# Patient Record
Sex: MALE | Race: WHITE | NOT HISPANIC OR LATINO | Employment: OTHER | URBAN - METROPOLITAN AREA
[De-identification: names, ages, dates, MRNs, and addresses within clinical notes are randomized per-mention and may not be internally consistent; named-entity substitution may affect disease eponyms.]

---

## 2017-06-14 ENCOUNTER — LAB (OUTPATIENT)
Dept: LAB | Facility: HOSPITAL | Age: 67
End: 2017-06-14
Attending: INTERNAL MEDICINE

## 2017-06-14 ENCOUNTER — APPOINTMENT (OUTPATIENT)
Dept: CARDIOLOGY | Facility: HOSPITAL | Age: 67
End: 2017-06-14
Attending: INTERNAL MEDICINE

## 2017-06-14 ENCOUNTER — OFFICE VISIT (OUTPATIENT)
Dept: CARDIOLOGY | Facility: CLINIC | Age: 67
End: 2017-06-14

## 2017-06-14 VITALS
HEART RATE: 122 BPM | SYSTOLIC BLOOD PRESSURE: 140 MMHG | DIASTOLIC BLOOD PRESSURE: 96 MMHG | HEIGHT: 73 IN | BODY MASS INDEX: 34.99 KG/M2 | WEIGHT: 264 LBS

## 2017-06-14 DIAGNOSIS — I48.0 PAF (PAROXYSMAL ATRIAL FIBRILLATION) (HCC): ICD-10-CM

## 2017-06-14 DIAGNOSIS — I10 ESSENTIAL HYPERTENSION: ICD-10-CM

## 2017-06-14 LAB
ALBUMIN SERPL-MCNC: 4.1 G/DL (ref 3.5–5.2)
ALBUMIN/GLOB SERPL: 1.3 G/DL
ALP SERPL-CCNC: 77 U/L (ref 40–129)
ALT SERPL W P-5'-P-CCNC: 21 U/L (ref 5–41)
ANION GAP SERPL CALCULATED.3IONS-SCNC: 14.9 MMOL/L
AST SERPL-CCNC: 26 U/L (ref 5–40)
BILIRUB SERPL-MCNC: 1.8 MG/DL (ref 0.2–1.2)
BUN BLD-MCNC: 21 MG/DL (ref 8–23)
BUN/CREAT SERPL: 24.4 (ref 7–25)
CALCIUM SPEC-SCNC: 9 MG/DL (ref 8.8–10.5)
CHLORIDE SERPL-SCNC: 100 MMOL/L (ref 98–107)
CO2 SERPL-SCNC: 22.1 MMOL/L (ref 22–29)
CREAT BLD-MCNC: 0.86 MG/DL (ref 0.76–1.27)
DEPRECATED RDW RBC AUTO: 40.6 FL (ref 37–54)
ERYTHROCYTE [DISTWIDTH] IN BLOOD BY AUTOMATED COUNT: 12.6 % (ref 11.5–14.5)
GFR SERPL CREATININE-BSD FRML MDRD: 89 ML/MIN/1.73
GLOBULIN UR ELPH-MCNC: 3.2 GM/DL
GLUCOSE BLD-MCNC: 109 MG/DL (ref 65–99)
HCT VFR BLD AUTO: 49.2 % (ref 42–52)
HGB BLD-MCNC: 17.3 G/DL (ref 14–18)
MCH RBC QN AUTO: 30.8 PG (ref 27–31)
MCHC RBC AUTO-ENTMCNC: 35.2 G/DL (ref 31–37)
MCV RBC AUTO: 87.7 FL (ref 80–94)
PLATELET # BLD AUTO: 227 10*3/MM3 (ref 140–500)
PMV BLD AUTO: 10.9 FL (ref 7.4–10.4)
POTASSIUM BLD-SCNC: 4.9 MMOL/L (ref 3.5–5.2)
PROT SERPL-MCNC: 7.3 G/DL (ref 6–8.5)
RBC # BLD AUTO: 5.61 10*6/MM3 (ref 4.7–6.1)
SODIUM BLD-SCNC: 137 MMOL/L (ref 136–145)
T-UPTAKE NFR SERPL: 1.01 TBI (ref 0.8–1.3)
T4 SERPL-MCNC: 7.42 MCG/DL (ref 4.5–11.7)
TSH SERPL DL<=0.05 MIU/L-ACNC: 1.54 MIU/ML (ref 0.27–4.2)
WBC NRBC COR # BLD: 10.97 10*3/MM3 (ref 4.8–10.8)

## 2017-06-14 PROCEDURE — 84436 ASSAY OF TOTAL THYROXINE: CPT

## 2017-06-14 PROCEDURE — 84479 ASSAY OF THYROID (T3 OR T4): CPT

## 2017-06-14 PROCEDURE — 80053 COMPREHEN METABOLIC PANEL: CPT

## 2017-06-14 PROCEDURE — 93000 ELECTROCARDIOGRAM COMPLETE: CPT | Performed by: INTERNAL MEDICINE

## 2017-06-14 PROCEDURE — 85027 COMPLETE CBC AUTOMATED: CPT

## 2017-06-14 PROCEDURE — 99214 OFFICE O/P EST MOD 30 MIN: CPT | Performed by: INTERNAL MEDICINE

## 2017-06-14 PROCEDURE — 84443 ASSAY THYROID STIM HORMONE: CPT

## 2017-06-14 PROCEDURE — 36415 COLL VENOUS BLD VENIPUNCTURE: CPT

## 2017-06-14 RX ORDER — SOTALOL HYDROCHLORIDE 160 MG/1
160 TABLET ORAL 2 TIMES DAILY
Qty: 180 TABLET | Refills: 3 | Status: SHIPPED | OUTPATIENT
Start: 2017-06-14 | End: 2018-04-30 | Stop reason: SDUPTHER

## 2017-06-14 NOTE — PROGRESS NOTES
Subjective:     Encounter Date:      Patient ID: Hakeem Onofre Jr. is a 66 y.o. male.    Chief Complaint:  History of Present Illness    Dr. bolivar hart,    I had the pleasure of seeing this patient in the office today for follow-up of his cardiac status.  Patient has a history of paroxysmal atrial fibrillation.    He states that 2 or 3 weeks ago he started noting that his heart rate was faster.  He was suspicious that he was back in atrial fibrillation.  He's had some greater fatigability but outside of that no other complaints.  No chest pain, pressure, tightness, squeezing, or heartburn.  No dizziness or lightheadedness.  No presyncope or syncope.  No lower extremity edema.  No other medical illnesses.  He is been compliant on his medical regimen, although today he is not sure what dose of metoprolol he is taking.  The last time we sent in a prescription in July 2016 it was for metoprolol succinate 100 mg to be taken once daily.  He thinks that might be right, but he is not sure.    He has no other known cardiac disease.This patient has no history of coronary artery disease, congestive heart failure, rheumatic fever, rheumatic heart disease, congenital heart disease or heart murmur.  This patient has never required invasive cardiovascular evaluation.  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  This demonstrated an ejection fraction of 62% and no ischemia.    Patient has a history of atrial flutter that was diagnosed in 2012 and at that point he underwent atrial flutter ablation.    The following portions of the patient's history were reviewed and updated as appropriate: allergies, current medications, past family history, past medical history, past social history, past surgical history and problem list.    Past Medical History:   Diagnosis Date   • Arrhythmia    • Atrial fibrillation    • Hypertension        Past Surgical History:   Procedure Laterality Date   • ABLATION OF DYSRHYTHMIC FOCUS   2012    for treatment of Afib/flutter   • CARDIAC CATHETERIZATION         Social History     Social History   • Marital status:      Spouse name: N/A   • Number of children: N/A   • Years of education: N/A     Occupational History   • Not on file.     Social History Main Topics   • Smoking status: Never Smoker   • Smokeless tobacco: Not on file   • Alcohol use 1.8 oz/week     3 Cans of beer per week   • Drug use: No   • Sexual activity: Defer     Other Topics Concern   • Not on file     Social History Narrative       Review of Systems   Constitution: Negative for chills, decreased appetite, fever and night sweats.   HENT: Negative for ear discharge, ear pain, hearing loss, nosebleeds and sore throat.    Eyes: Negative for blurred vision, double vision and pain.   Cardiovascular: Negative for cyanosis.   Respiratory: Negative for hemoptysis and sputum production.    Endocrine: Negative for cold intolerance and heat intolerance.   Hematologic/Lymphatic: Negative for adenopathy.   Skin: Negative for dry skin, itching, nail changes, rash and suspicious lesions.   Musculoskeletal: Negative for arthritis, gout, muscle cramps, muscle weakness, myalgias and neck pain.   Gastrointestinal: Negative for anorexia, bowel incontinence, constipation, diarrhea, dysphagia, hematemesis and jaundice.   Genitourinary: Negative for bladder incontinence, dysuria, flank pain, frequency, hematuria and nocturia.   Neurological: Negative for focal weakness, numbness, paresthesias and seizures.   Psychiatric/Behavioral: Negative for altered mental status, hallucinations, hypervigilance, suicidal ideas and thoughts of violence.   Allergic/Immunologic: Negative for persistent infections.         ECG 12 Lead  Date/Time: 6/14/2017 1:07 PM  Performed by: KONG DAWSON III  Authorized by: KONG DAWSON III   Comparison: compared with previous ECG   Comparison to previous ECG: A. fib is new  Rhythm: atrial fibrillation  Rate:  "tachycardic  ST Segments: ST segments normal  T Waves: T waves normal  QRS axis: normal  Other: no other findings  Clinical impression: abnormal ECG               Objective:     Vitals:    06/14/17 1215   BP: 140/96   Pulse: (!) 122   Weight: 264 lb (120 kg)   Height: 73\" (185.4 cm)         Physical Exam   Constitutional: He is oriented to person, place, and time. He appears well-developed and well-nourished. No distress.   HENT:   Head: Normocephalic and atraumatic.   Nose: Nose normal.   Mouth/Throat: Oropharynx is clear and moist.   Eyes: Conjunctivae and EOM are normal. Pupils are equal, round, and reactive to light. Right eye exhibits no discharge. Left eye exhibits no discharge.   Neck: Normal range of motion. Neck supple. No tracheal deviation present. No thyromegaly present.   Cardiovascular: Normal rate, regular rhythm, S1 normal, S2 normal, normal heart sounds and normal pulses.  Exam reveals no S3.    Pulmonary/Chest: Effort normal and breath sounds normal. No stridor. No respiratory distress. He exhibits no tenderness.   Abdominal: Soft. Bowel sounds are normal. He exhibits no distension and no mass. There is no tenderness. There is no rebound and no guarding.   Musculoskeletal: Normal range of motion. He exhibits no tenderness or deformity.   Lymphadenopathy:     He has no cervical adenopathy.   Neurological: He is alert and oriented to person, place, and time. He has normal reflexes.   Skin: Skin is warm and dry. No rash noted. He is not diaphoretic. No erythema.   Psychiatric: He has a normal mood and affect. Thought content normal.       Lab Review:           performed      Assessment:          Diagnosis Plan   1. PAF (paroxysmal atrial fibrillation)  Comprehensive Metabolic Panel    Thyroid Panel With TSH    CBC (No Diff)    Holter Monitor - 24 Hour   2. Essential hypertension            Plan:       1. Atrial Fibrillation and Atrial Flutter  Assessment  • The patient has paroxysmal atrial " fibrillation  • This is non-valvular in etiology  • The patient's CHADS2-VASc score is 2  • A [id number] score of 2 or more is considered a high risk for a thromboembolic event  • Rivaroxaban prescribed    Plan  • Attempt to maintain sinus rhythm  • Continue rivaroxaban for antithrombotic therapy, bleeding issues discussed  • Add sotalol for rhythm control  • We will obtain labs today and increase his sotalol to 160 mg twice daily.  Additionally, he will contact our office to confirm the dose of diuretics that he is taking at home-he's currently not sure.  He is supposed to be on metoprolol succinate 100 mg once daily.  We will see him back in 2 weeks.      Thank you very much for allowing us to participate in the care of this pleasant patient.  Please don't hesitate to call if I can be of assistance in any way.      Current Outpatient Prescriptions:   •  lisinopril (PRINIVIL,ZESTRIL) 10 MG tablet, Take 1 tablet by mouth daily., Disp: 90 tablet, Rfl: 3  •  sotalol (BETAPACE) 120 MG tablet, TAKE ONE TABLET BY MOUTH TWICE DAILY, Disp: 180 tablet, Rfl: 1  •  XARELTO 20 MG tablet, TAKE ONE TABLET BY MOUTH ONCE DAILY WITH FOOD, Disp: 30 tablet, Rfl: 3  •  metoprolol succinate XL (TOPROL-XL) 100 MG 24 hr tablet, TAKE ONE TABLET BY MOUTH  DAILY, Disp: 30 tablet, Rfl: 3  •  metoprolol tartrate (LOPRESSOR) 50 MG tablet, Take 50 mg by mouth 2 (two) times a day., Disp: , Rfl:

## 2017-06-19 ENCOUNTER — HOSPITAL ENCOUNTER (OUTPATIENT)
Dept: CARDIOLOGY | Facility: HOSPITAL | Age: 67
Discharge: HOME | End: 2017-06-19
Attending: INTERNAL MEDICINE | Admitting: INTERNAL MEDICINE

## 2017-06-19 DIAGNOSIS — I48.0 PAF (PAROXYSMAL ATRIAL FIBRILLATION) (HCC): ICD-10-CM

## 2017-06-19 PROCEDURE — 93225 XTRNL ECG REC<48 HRS REC: CPT

## 2017-06-19 PROCEDURE — 93226 XTRNL ECG REC<48 HR SCAN A/R: CPT

## 2017-06-20 ENCOUNTER — APPOINTMENT (OUTPATIENT)
Dept: CARDIOLOGY | Facility: HOSPITAL | Age: 67
End: 2017-06-20
Attending: INTERNAL MEDICINE

## 2017-06-23 PROCEDURE — 93227 XTRNL ECG REC<48 HR R&I: CPT | Performed by: INTERNAL MEDICINE

## 2017-07-05 ENCOUNTER — OFFICE VISIT (OUTPATIENT)
Dept: CARDIOLOGY | Facility: CLINIC | Age: 67
End: 2017-07-05

## 2017-07-05 VITALS
HEART RATE: 92 BPM | WEIGHT: 271 LBS | BODY MASS INDEX: 35.92 KG/M2 | DIASTOLIC BLOOD PRESSURE: 94 MMHG | HEIGHT: 73 IN | SYSTOLIC BLOOD PRESSURE: 122 MMHG

## 2017-07-05 DIAGNOSIS — I48.0 PAROXYSMAL ATRIAL FIBRILLATION (HCC): Primary | ICD-10-CM

## 2017-07-05 DIAGNOSIS — I10 ESSENTIAL HYPERTENSION: ICD-10-CM

## 2017-07-05 PROCEDURE — 99214 OFFICE O/P EST MOD 30 MIN: CPT | Performed by: INTERNAL MEDICINE

## 2017-07-05 PROCEDURE — 93000 ELECTROCARDIOGRAM COMPLETE: CPT | Performed by: INTERNAL MEDICINE

## 2017-07-05 NOTE — PROGRESS NOTES
Subjective:     Encounter Date: 7/5/2017      Patient ID: Hakeem Onofre Jr. is a 66 y.o. male.    Chief Complaint:  History of Present Illness    Dr. bolivar hart,    I had the pleasure of seeing this patient in the office today for follow-up of his cardiac status.  Patient has a history of paroxysmal atrial fibrillation.He recently came to our office and was found to be back in atrial fibrillation.  We increased his sotalol and had him come back today.  He says that he's been feeling okay and on the higher dose sotalol he really doesn't feel anything; no palpitations or heart racing.  He can occasionally note that his heart is irregular, any particular notice this when he is laying down.  He was pretty sure when he came in that he was still in the atrial fibrillation.    He had right eye surgery for cataracts last week and is scheduled to have cataract surgery on his left eye next week.    He has no other known cardiac disease.This patient has no history of coronary artery disease, congestive heart failure, rheumatic fever, rheumatic heart disease, congenital heart disease or heart murmur.  This patient has never required invasive cardiovascular evaluation.  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  This demonstrated an ejection fraction of 62% and no ischemia.    Patient has a history of atrial flutter that was diagnosed in 2012 and at that point he underwent atrial flutter ablation.    The following portions of the patient's history were reviewed and updated as appropriate: allergies, current medications, past family history, past medical history, past social history, past surgical history and problem list.    Past Medical History:   Diagnosis Date   • Arrhythmia    • Atrial fibrillation    • Hypertension        Past Surgical History:   Procedure Laterality Date   • ABLATION OF DYSRHYTHMIC FOCUS  2012    for treatment of Afib/flutter   • CARDIAC CATHETERIZATION     • CATARACT EXTRACTION          Social History     Social History   • Marital status:      Spouse name: N/A   • Number of children: N/A   • Years of education: N/A     Occupational History   • Not on file.     Social History Main Topics   • Smoking status: Never Smoker   • Smokeless tobacco: Not on file   • Alcohol use 1.8 oz/week     3 Cans of beer per week   • Drug use: No   • Sexual activity: Defer     Other Topics Concern   • Not on file     Social History Narrative       Review of Systems   Constitution: Negative for chills, decreased appetite, fever and night sweats.   HENT: Negative for ear discharge, ear pain, hearing loss, nosebleeds and sore throat.    Eyes: Negative for blurred vision, double vision and pain.   Cardiovascular: Negative for cyanosis.   Respiratory: Negative for hemoptysis and sputum production.    Endocrine: Negative for cold intolerance and heat intolerance.   Hematologic/Lymphatic: Negative for adenopathy.   Skin: Negative for dry skin, itching, nail changes, rash and suspicious lesions.   Musculoskeletal: Negative for arthritis, gout, muscle cramps, muscle weakness, myalgias and neck pain.   Gastrointestinal: Negative for anorexia, bowel incontinence, constipation, diarrhea, dysphagia, hematemesis and jaundice.   Genitourinary: Negative for bladder incontinence, dysuria, flank pain, frequency, hematuria and nocturia.   Neurological: Negative for focal weakness, numbness, paresthesias and seizures.   Psychiatric/Behavioral: Negative for altered mental status, hallucinations, hypervigilance, suicidal ideas and thoughts of violence.   Allergic/Immunologic: Negative for persistent infections.         ECG 12 Lead  Date/Time: 7/5/2017 1:19 PM  Performed by: KONG DAWSON III  Authorized by: KONG DAWSON III   Comparison: compared with previous ECG   Similar to previous ECG  Rhythm: atrial fibrillation  Rate: normal  Conduction: conduction normal  ST Segments: ST segments normal  T Waves: T waves  "normal  QRS axis: normal  Other: no other findings  Clinical impression: normal ECG               Objective:     Vitals:    07/05/17 1233   BP: 122/94   Pulse: 92   Weight: 271 lb (123 kg)   Height: 73\" (185.4 cm)         Physical Exam   Constitutional: He is oriented to person, place, and time. He appears well-developed and well-nourished. No distress.   HENT:   Head: Normocephalic and atraumatic.   Nose: Nose normal.   Mouth/Throat: Oropharynx is clear and moist.   Eyes: Conjunctivae and EOM are normal. Pupils are equal, round, and reactive to light. Right eye exhibits no discharge. Left eye exhibits no discharge.   Neck: Normal range of motion. Neck supple. No tracheal deviation present. No thyromegaly present.   Cardiovascular: Normal rate, regular rhythm, S1 normal, S2 normal, normal heart sounds and normal pulses.  Exam reveals no S3.    Pulmonary/Chest: Effort normal and breath sounds normal. No stridor. No respiratory distress. He exhibits no tenderness.   Abdominal: Soft. Bowel sounds are normal. He exhibits no distension and no mass. There is no tenderness. There is no rebound and no guarding.   Musculoskeletal: Normal range of motion. He exhibits no tenderness or deformity.   Lymphadenopathy:     He has no cervical adenopathy.   Neurological: He is alert and oriented to person, place, and time. He has normal reflexes.   Skin: Skin is warm and dry. No rash noted. He is not diaphoretic. No erythema.   Psychiatric: He has a normal mood and affect. Thought content normal.       Lab Review:           performed      Assessment:          Diagnosis Plan   1. Paroxysmal atrial fibrillation  Cardioversion External in Cardiology Department    ECG 12 Lead   2. Essential hypertension            Plan:       1. Atrial Fibrillation and Atrial Flutter  Assessment  • The patient has paroxysmal atrial fibrillation  • This is non-valvular in etiology  • The patient's CHADS2-VASc score is 2  • A [id number] score of 2 or " more is considered a high risk for a thromboembolic event  • Rivaroxaban prescribed    Plan  • Attempt to maintain sinus rhythm  • Continue rivaroxaban for antithrombotic therapy, bleeding issues discussed  • Continue sotalol for rhythm control  • He remains in atrial fibrillation on the increased dose of sotalol-I will arrange for a cardioversion to be performed.  We will plan this in about 3 weeks, after he is had and then recovered from his left eye cataract surgery      Thank you very much for allowing us to participate in the care of this pleasant patient.  Please don't hesitate to call if I can be of assistance in any way.      Current Outpatient Prescriptions:   •  lisinopril (PRINIVIL,ZESTRIL) 10 MG tablet, Take 1 tablet by mouth daily., Disp: 90 tablet, Rfl: 3  •  metoprolol succinate XL (TOPROL-XL) 100 MG 24 hr tablet, TAKE ONE TABLET BY MOUTH  DAILY, Disp: 30 tablet, Rfl: 3  •  sotalol (BETAPACE) 160 MG tablet, Take 1 tablet by mouth 2 (Two) Times a Day., Disp: 180 tablet, Rfl: 3  •  XARELTO 20 MG tablet, TAKE ONE TABLET BY MOUTH ONCE DAILY WITH FOOD, Disp: 30 tablet, Rfl: 3

## 2017-07-24 ENCOUNTER — TELEPHONE (OUTPATIENT)
Dept: CARDIOLOGY | Facility: CLINIC | Age: 67
End: 2017-07-24

## 2017-07-24 NOTE — TELEPHONE ENCOUNTER
Pt calls to report that he has had his second eye surgery without any complications however he was told by the surgeon that he appeared to be in sinus rhythm. Mr. Onofre wanted to know if this would change his treatment plan with the upcoming cardioversion, he states that he is scheduled for one later this week with you but I did not see it in his chart. Please advise    [phone]

## 2017-07-26 ENCOUNTER — CLINICAL SUPPORT (OUTPATIENT)
Dept: CARDIOLOGY | Facility: CLINIC | Age: 67
End: 2017-07-26

## 2017-07-26 DIAGNOSIS — I48.0 PAF (PAROXYSMAL ATRIAL FIBRILLATION) (HCC): Primary | ICD-10-CM

## 2017-07-26 PROCEDURE — 93000 ELECTROCARDIOGRAM COMPLETE: CPT | Performed by: INTERNAL MEDICINE

## 2017-07-27 ENCOUNTER — APPOINTMENT (OUTPATIENT)
Dept: POSTOP/PACU | Facility: HOSPITAL | Age: 67
End: 2017-07-27
Attending: INTERNAL MEDICINE

## 2017-08-14 RX ORDER — RIVAROXABAN 20 MG/1
TABLET, FILM COATED ORAL
Qty: 30 TABLET | Refills: 0 | Status: SHIPPED | OUTPATIENT
Start: 2017-08-14 | End: 2017-09-21 | Stop reason: SDUPTHER

## 2017-08-14 RX ORDER — METOPROLOL SUCCINATE 100 MG/1
TABLET, EXTENDED RELEASE ORAL
Qty: 30 TABLET | Refills: 0 | Status: SHIPPED | OUTPATIENT
Start: 2017-08-14 | End: 2017-09-23 | Stop reason: SDUPTHER

## 2017-09-21 RX ORDER — RIVAROXABAN 20 MG/1
TABLET, FILM COATED ORAL
Qty: 30 TABLET | Refills: 4 | Status: SHIPPED | OUTPATIENT
Start: 2017-09-21 | End: 2018-04-30 | Stop reason: SDUPTHER

## 2017-09-25 RX ORDER — METOPROLOL SUCCINATE 100 MG/1
TABLET, EXTENDED RELEASE ORAL
Qty: 30 TABLET | Refills: 5 | Status: SHIPPED | OUTPATIENT
Start: 2017-09-25 | End: 2018-04-30 | Stop reason: SDUPTHER

## 2017-10-09 NOTE — PROGRESS NOTES
Subjective:     Encounter Date:07/26/2017      Patient ID: Hakeem Onofre Jr. is a 67 y.o. male.    Chief Complaint:  History of Present Illness      ROS      ECG 12 Lead  Date/Time: 7/26/2017 1:52 PM  Performed by: KONG DAWSON III  Authorized by: KONG ADWSON III   Comparison: compared with previous ECG   Similar to previous ECG  Rhythm: sinus rhythm  Rate: normal  Conduction: conduction normal  ST Segments: ST segments normal  T Waves: T waves normal  QRS axis: normal  Other: no other findings  Clinical impression: normal ECG               Objective:     Physical Exam    Lab Review:       Assessment:          Diagnosis Plan   1. PAF (paroxysmal atrial fibrillation)  ECG 12 Lead          Plan:

## 2018-05-01 RX ORDER — RIVAROXABAN 20 MG/1
TABLET, FILM COATED ORAL
Qty: 90 TABLET | Refills: 0 | Status: SHIPPED | OUTPATIENT
Start: 2018-05-01 | End: 2018-07-24 | Stop reason: SDUPTHER

## 2018-05-01 RX ORDER — METOPROLOL SUCCINATE 100 MG/1
TABLET, EXTENDED RELEASE ORAL
Qty: 90 TABLET | Refills: 0 | Status: SHIPPED | OUTPATIENT
Start: 2018-05-01 | End: 2018-07-24 | Stop reason: SDUPTHER

## 2018-05-01 RX ORDER — LISINOPRIL 10 MG/1
TABLET ORAL
Qty: 90 TABLET | Refills: 0 | Status: SHIPPED | OUTPATIENT
Start: 2018-05-01 | End: 2018-12-05 | Stop reason: SDUPTHER

## 2018-05-02 RX ORDER — SOTALOL HYDROCHLORIDE 160 MG/1
TABLET ORAL
Qty: 180 TABLET | Refills: 1 | Status: SHIPPED | OUTPATIENT
Start: 2018-05-02 | End: 2018-07-24 | Stop reason: SDUPTHER

## 2018-07-25 RX ORDER — RIVAROXABAN 20 MG/1
TABLET, FILM COATED ORAL
Qty: 30 TABLET | Refills: 0 | Status: SHIPPED | OUTPATIENT
Start: 2018-07-25 | End: 2018-09-20 | Stop reason: SDUPTHER

## 2018-07-25 RX ORDER — SOTALOL HYDROCHLORIDE 160 MG/1
TABLET ORAL
Qty: 30 TABLET | Refills: 1 | Status: SHIPPED | OUTPATIENT
Start: 2018-07-25 | End: 2018-10-30 | Stop reason: SDUPTHER

## 2018-07-25 RX ORDER — METOPROLOL SUCCINATE 100 MG/1
TABLET, EXTENDED RELEASE ORAL
Qty: 30 TABLET | Refills: 0 | Status: SHIPPED | OUTPATIENT
Start: 2018-07-25 | End: 2018-09-20 | Stop reason: SDUPTHER

## 2018-09-20 RX ORDER — RIVAROXABAN 20 MG/1
TABLET, FILM COATED ORAL
Qty: 30 TABLET | Refills: 0 | Status: SHIPPED | OUTPATIENT
Start: 2018-09-20 | End: 2018-10-30 | Stop reason: SDUPTHER

## 2018-09-20 RX ORDER — METOPROLOL SUCCINATE 100 MG/1
TABLET, EXTENDED RELEASE ORAL
Qty: 30 TABLET | Refills: 0 | Status: SHIPPED | OUTPATIENT
Start: 2018-09-20 | End: 2018-10-30 | Stop reason: SDUPTHER

## 2018-10-30 RX ORDER — METOPROLOL SUCCINATE 100 MG/1
100 TABLET, EXTENDED RELEASE ORAL DAILY
Qty: 30 TABLET | Refills: 0 | Status: SHIPPED | OUTPATIENT
Start: 2018-10-30 | End: 2018-12-05 | Stop reason: SDUPTHER

## 2018-10-30 RX ORDER — SOTALOL HYDROCHLORIDE 160 MG/1
160 TABLET ORAL 2 TIMES DAILY
Qty: 30 TABLET | Refills: 1 | Status: SHIPPED | OUTPATIENT
Start: 2018-10-30 | End: 2018-12-05 | Stop reason: SDUPTHER

## 2018-12-05 ENCOUNTER — OFFICE VISIT (OUTPATIENT)
Dept: CARDIOLOGY | Facility: CLINIC | Age: 68
End: 2018-12-05

## 2018-12-05 VITALS
HEART RATE: 66 BPM | WEIGHT: 274 LBS | DIASTOLIC BLOOD PRESSURE: 96 MMHG | HEIGHT: 73 IN | BODY MASS INDEX: 36.31 KG/M2 | SYSTOLIC BLOOD PRESSURE: 160 MMHG

## 2018-12-05 DIAGNOSIS — I48.0 PAROXYSMAL ATRIAL FIBRILLATION (HCC): Primary | ICD-10-CM

## 2018-12-05 PROCEDURE — 99214 OFFICE O/P EST MOD 30 MIN: CPT | Performed by: INTERNAL MEDICINE

## 2018-12-05 PROCEDURE — 93000 ELECTROCARDIOGRAM COMPLETE: CPT | Performed by: INTERNAL MEDICINE

## 2018-12-05 RX ORDER — METOPROLOL SUCCINATE 100 MG/1
100 TABLET, EXTENDED RELEASE ORAL DAILY
Qty: 90 TABLET | Refills: 3 | Status: SHIPPED | OUTPATIENT
Start: 2018-12-05 | End: 2019-11-08 | Stop reason: SDUPTHER

## 2018-12-05 RX ORDER — SOTALOL HYDROCHLORIDE 160 MG/1
160 TABLET ORAL 2 TIMES DAILY
Qty: 90 TABLET | Refills: 3 | Status: SHIPPED | OUTPATIENT
Start: 2018-12-05 | End: 2019-05-29 | Stop reason: SDUPTHER

## 2018-12-05 RX ORDER — LISINOPRIL 10 MG/1
10 TABLET ORAL DAILY
Qty: 90 TABLET | Refills: 3 | Status: SHIPPED | OUTPATIENT
Start: 2018-12-05 | End: 2019-08-07

## 2018-12-05 NOTE — PROGRESS NOTES
Subjective:     Encounter Date: 12/5/2018      Patient ID: Hakeem Onofre Jr. is a 68 y.o. male.    Chief Complaint: PAF  History of Present Illness      I had the pleasure of seeing this patient in the office today for follow-up of his cardiac status.  Patient has a history of paroxysmal atrial fibrillation. He had a cardioversion in 2017 and since then has not had any recurrent arrhythmia.    Over his last year he has been doing well without any problem. This patient denies any chest pain, pressure, tightness, squeezing, or heartburn.  This patient has not experienced any feeling of palpitations, tachycardia or heart racing and no presyncope or syncope.  There has not been any problems with dizziness or lightheadedness.  There has not been any orthopnea or PND, and no problems with lower extremity edema.  This patient denies any shortness of breath at rest or with activity and has not had any wheezing.  The patient has continued to perform daily activities of living without any specific problem or change in the level of activity.    He has no other known cardiac disease.This patient has no history of coronary artery disease, congestive heart failure, rheumatic fever, rheumatic heart disease, congenital heart disease or heart murmur.  This patient has never required invasive cardiovascular evaluation.  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  This demonstrated an ejection fraction of 62% and no ischemia.    Patient has a history of atrial flutter that was diagnosed in 2012 and at that point he underwent atrial flutter ablation.    The following portions of the patient's history were reviewed and updated as appropriate: allergies, current medications, past family history, past medical history, past social history, past surgical history and problem list.    Past Medical History:   Diagnosis Date   • Arrhythmia    • Atrial fibrillation (CMS/HCC)    • Hypertension        Past Surgical History:    Procedure Laterality Date   • ABLATION OF DYSRHYTHMIC FOCUS  2012    for treatment of Afib/flutter   • CARDIAC CATHETERIZATION     • CATARACT EXTRACTION         Social History     Socioeconomic History   • Marital status:      Spouse name: Not on file   • Number of children: Not on file   • Years of education: Not on file   • Highest education level: Not on file   Social Needs   • Financial resource strain: Not on file   • Food insecurity - worry: Not on file   • Food insecurity - inability: Not on file   • Transportation needs - medical: Not on file   • Transportation needs - non-medical: Not on file   Occupational History   • Not on file   Tobacco Use   • Smoking status: Never Smoker   Substance and Sexual Activity   • Alcohol use: Yes     Alcohol/week: 1.8 oz     Types: 3 Cans of beer per week   • Drug use: No   • Sexual activity: Defer   Other Topics Concern   • Not on file   Social History Narrative   • Not on file       Review of Systems   Constitution: Negative for chills, decreased appetite, fever and night sweats.   HENT: Negative for ear discharge, ear pain, hearing loss, nosebleeds and sore throat.    Eyes: Negative for blurred vision, double vision and pain.   Cardiovascular: Negative for cyanosis.   Respiratory: Negative for hemoptysis and sputum production.    Endocrine: Negative for cold intolerance and heat intolerance.   Hematologic/Lymphatic: Negative for adenopathy.   Skin: Negative for dry skin, itching, nail changes, rash and suspicious lesions.   Musculoskeletal: Negative for arthritis, gout, muscle cramps, muscle weakness, myalgias and neck pain.   Gastrointestinal: Negative for anorexia, bowel incontinence, constipation, diarrhea, dysphagia, hematemesis and jaundice.   Genitourinary: Negative for bladder incontinence, dysuria, flank pain, frequency, hematuria and nocturia.   Neurological: Negative for focal weakness, numbness, paresthesias and seizures.   Psychiatric/Behavioral:  "Negative for altered mental status, hallucinations, hypervigilance, suicidal ideas and thoughts of violence.   Allergic/Immunologic: Negative for persistent infections.         ECG 12 Lead  Date/Time: 12/5/2018 2:48 PM  Performed by: Donnie Berger III, MD  Authorized by: Donnie Berger III, MD   Comparison: compared with previous ECG   Similar to previous ECG  Rhythm: sinus rhythm  Rate: normal  Conduction: conduction normal  ST Segments: ST segments normal  T Waves: T waves normal  QRS axis: normal  Other: no other findings  Clinical impression: normal ECG               Objective:     Vitals:    12/05/18 1432   BP: 160/96   Pulse: 66   Weight: 124 kg (274 lb)   Height: 185.4 cm (73\")         Physical Exam   Constitutional: He is oriented to person, place, and time. He appears well-developed and well-nourished. No distress.   HENT:   Head: Normocephalic and atraumatic.   Nose: Nose normal.   Mouth/Throat: Oropharynx is clear and moist.   Eyes: Conjunctivae and EOM are normal. Pupils are equal, round, and reactive to light. Right eye exhibits no discharge. Left eye exhibits no discharge.   Neck: Normal range of motion. Neck supple. No tracheal deviation present. No thyromegaly present.   Cardiovascular: Normal rate, regular rhythm, S1 normal, S2 normal, normal heart sounds and normal pulses. Exam reveals no S3.   Pulmonary/Chest: Effort normal and breath sounds normal. No stridor. No respiratory distress. He exhibits no tenderness.   Abdominal: Soft. Bowel sounds are normal. He exhibits no distension and no mass. There is no tenderness. There is no rebound and no guarding.   Musculoskeletal: Normal range of motion. He exhibits no tenderness or deformity.   Lymphadenopathy:     He has no cervical adenopathy.   Neurological: He is alert and oriented to person, place, and time. He has normal reflexes.   Skin: Skin is warm and dry. No rash noted. He is not diaphoretic. No erythema.   Psychiatric: He has a normal mood " and affect. Thought content normal.       Lab Review:           performed      Assessment:          Diagnosis Plan   1. Paroxysmal atrial fibrillation (CMS/HCC)  ECG 12 Lead          Plan:       1. Atrial Fibrillation and Atrial Flutter  Assessment  • The patient has paroxysmal atrial fibrillation  • This is non-valvular in etiology  • The patient's CHADS2-VASc score is 2  • A [id number]-[id number] score of 2 or more is considered a high risk for a thromboembolic event  • Rivaroxaban prescribed    Plan  • Attempt to maintain sinus rhythm  • Continue rivaroxaban for antithrombotic therapy, bleeding issues discussed  • Continue sotalol for rhythm control    2. HTN- elevated today, follow log  Thank you very much for allowing us to participate in the care of this pleasant patient.  Please don't hesitate to call if I can be of assistance in any way.      Current Outpatient Medications:   •  lisinopril (PRINIVIL,ZESTRIL) 10 MG tablet, TAKE ONE TABLET BY MOUTH ONCE DAILY, Disp: 90 tablet, Rfl: 0  •  metoprolol succinate XL (TOPROL-XL) 100 MG 24 hr tablet, Take 1 tablet by mouth Daily., Disp: 30 tablet, Rfl: 0  •  rivaroxaban (XARELTO) 20 MG tablet, Take 1 tablet by mouth Daily. with food., Disp: 30 tablet, Rfl: 0  •  sotalol (BETAPACE) 160 MG tablet, Take 1 tablet by mouth 2 (Two) Times a Day., Disp: 30 tablet, Rfl: 1

## 2019-05-29 DIAGNOSIS — I48.0 PAROXYSMAL ATRIAL FIBRILLATION (HCC): ICD-10-CM

## 2019-05-29 RX ORDER — SOTALOL HYDROCHLORIDE 160 MG/1
TABLET ORAL
Qty: 90 TABLET | Refills: 1 | Status: SHIPPED | OUTPATIENT
Start: 2019-05-29 | End: 2019-09-08 | Stop reason: SDUPTHER

## 2019-06-07 ENCOUNTER — TELEPHONE (OUTPATIENT)
Dept: CARDIOLOGY | Facility: CLINIC | Age: 69
End: 2019-06-07

## 2019-06-17 ENCOUNTER — OFFICE VISIT (OUTPATIENT)
Dept: CARDIOLOGY | Facility: CLINIC | Age: 69
End: 2019-06-17

## 2019-06-17 VITALS
DIASTOLIC BLOOD PRESSURE: 68 MMHG | HEIGHT: 73 IN | BODY MASS INDEX: 33.53 KG/M2 | SYSTOLIC BLOOD PRESSURE: 130 MMHG | WEIGHT: 253 LBS | HEART RATE: 60 BPM

## 2019-06-17 DIAGNOSIS — Z87.19 HISTORY OF GI BLEED: ICD-10-CM

## 2019-06-17 DIAGNOSIS — I10 ESSENTIAL HYPERTENSION: Primary | Chronic | ICD-10-CM

## 2019-06-17 DIAGNOSIS — I48.0 PAROXYSMAL ATRIAL FIBRILLATION (HCC): Chronic | ICD-10-CM

## 2019-06-17 DIAGNOSIS — E66.01 MORBIDLY OBESE (HCC): ICD-10-CM

## 2019-06-17 PROCEDURE — 93000 ELECTROCARDIOGRAM COMPLETE: CPT | Performed by: NURSE PRACTITIONER

## 2019-06-17 PROCEDURE — 99214 OFFICE O/P EST MOD 30 MIN: CPT | Performed by: NURSE PRACTITIONER

## 2019-06-17 RX ORDER — FUROSEMIDE 20 MG/1
20 TABLET ORAL DAILY
Qty: 30 TABLET | Refills: 11 | Status: SHIPPED | OUTPATIENT
Start: 2019-06-17 | End: 2019-11-18

## 2019-06-17 RX ORDER — PANTOPRAZOLE SODIUM 40 MG/1
40 TABLET, DELAYED RELEASE ORAL 2 TIMES DAILY
COMMUNITY
End: 2019-11-18

## 2019-06-17 NOTE — PROGRESS NOTES
Subjective:     Encounter Date:06/17/2019      Patient ID: Hakeem Onofre Jr. is a 68 y.o. male.    Chief Complaint: Follow-up recent hospitalization, GI bleed, atrial fibrillation  History of Present Illness  He is a new patient to me and I reviewed his past medical records.  He is a patient of Dr. Berger.  He has a history of paroxysmal atrial fibrillation.  He had a cardioversion in 2017.  He has a history of atrial flutter that was diagnosed in 2012 and underwent atrial flutter ablation at that time.    He presents today, 6/17/2019, in follow-up to recent hospitalization at [facility].  He states he and his wife were in [facility] to meet their new grandchild.  The day they were to leave, the patient had 3 episodes of nausea, vomiting and diarrhea.  By the third episode he saw blood in his vomitus and in his stool.  His son called EMS and he was taken to the hospital.  He was admitted to the ICU with coffee-ground emesis, dark stools and found to have supratherapeutic INR on Xarelto, bradycardic (HR 49), hypotension (70/44).  He underwent an EGD that showed acute gastritis and non-bleeding 1.2 cm gastric ulcer that was clipped.  He received a total of 4 units of packed red blood cells as well as 1 unit of FFP.  His course was complicated by atrial flutter with RVR and he was started back on metoprolol and sotalol.  He was then restarted on a heparin drip for anticoagulation after GI clearance but patient unfortunately had a bloody bowel movement the same day after which time the heparin was stopped.  He had not had a previous GI bleeding history and states he was started on Xarelto about 5 years ago without any bleeding complications.  He had had a colonoscopy perhaps 10 years ago which showed polyps that were resected but never had an EGD before this admission.    H. pylori serology was positive, stool Ag negative.  He was instructed to have a repeat EGD in 6 to 8 weeks to assess the  healing of his gastric ulcer.  He was started on Protonix 40 mg twice daily.  His hemoglobin day of discharge was 7.3.  He has appointment to follow-up with Dr. Medley in a couple of weeks.  He is currently off oral anticoagulation.  We will address when cleared by GI.    He denies any palpitations, shortness of breath, chest pain or chest tightness.  He denies any lightheadedness or dizziness.  He does have some fatigue since his hospitalization.  He complains of bilateral foot lower extremity pain and edema.  His left foot has 1+ pitting edema up to his knee.  Right foot 2+ pitting edema up to his knee.  He is not on a diuretic.    He is currently off his Xarelto and lisinopril.      The following portions of the patient's history were reviewed and updated as appropriate: allergies, current medications, past family history, past medical history, past social history, past surgical history and problem list.    Review of Systems   Constitution: Negative for weakness and malaise/fatigue.   HENT: Negative for congestion, hoarse voice and sore throat.    Eyes: Negative for blurred vision, double vision, photophobia, vision loss in left eye and vision loss in right eye.   Cardiovascular: Negative for chest pain, dyspnea on exertion, irregular heartbeat, leg swelling, near-syncope, orthopnea, palpitations, paroxysmal nocturnal dyspnea and syncope.   Respiratory: Negative for cough, hemoptysis, shortness of breath, sleep disturbances due to breathing, snoring, sputum production and wheezing.    Endocrine: Negative.    Hematologic/Lymphatic: Does not bruise/bleed easily.   Skin: Negative for color change, dry skin, poor wound healing and rash.   Musculoskeletal: Negative for back pain, falls, gout, joint pain, joint swelling, muscle cramps and muscle weakness.   Gastrointestinal: Negative for abdominal pain, constipation, diarrhea, dysphagia, melena, nausea and vomiting.   Neurological: Negative for excessive daytime  sleepiness, dizziness, headaches, light-headedness, loss of balance, numbness, paresthesias, seizures and vertigo.   Psychiatric/Behavioral: Negative for depression and substance abuse. The patient is not nervous/anxious.        ECG 12 Lead  Date/Time: 6/17/2019 1:59 PM  Performed by: Lucila Richards APRN  Authorized by: Lucila Richards APRN   Comparison: compared with previous ECG from 12/5/2018  Similar to previous ECG  Rhythm: sinus rhythm  Rate: normal  Conduction: conduction normal  ST Segments: ST segments normal  T Waves: T waves normal  QRS axis: normal    Clinical impression: normal ECG               Objective:         Physical Exam   Constitutional: He is oriented to person, place, and time. Vital signs are normal. He appears well-developed and well-nourished. No distress.   HENT:   Head: Normocephalic and atraumatic.   Right Ear: Hearing normal.   Left Ear: Hearing normal.   Eyes: Conjunctivae and lids are normal.   Neck: Normal range of motion. Neck supple. No JVD present. Carotid bruit is not present. No thyromegaly present.   Cardiovascular: Normal rate, regular rhythm, S1 normal, S2 normal, normal heart sounds and intact distal pulses.  PMI is not displaced.  Exam reveals no gallop.    No murmur heard.  Pulses:       Carotid pulses are 2+ on the right side, and 2+ on the left side.       Radial pulses are 2+ on the right side, and 2+ on the left side.        Dorsalis pedis pulses are 2+ on the right side, and 2+ on the left side.        Posterior tibial pulses are 2+ on the right side, and 2+ on the left side.   Pulmonary/Chest: Effort normal and breath sounds normal. No respiratory distress. He has no wheezes. He has no rhonchi. He has no rales. He exhibits no tenderness.   Abdominal: Soft. Normal appearance and bowel sounds are normal. He exhibits no distension, no abdominal bruit and no mass. There is no tenderness.   Musculoskeletal: Normal range of motion.   Exhibits no deformity 1+ pedal and lower  "extremity edema left,  1-2+ lower extremity edema to knee right  Lymphadenopathy:     He has no cervical adenopathy.   Neurological: He is alert and oriented to person, place, and time. No cranial nerve deficit. Coordination and gait normal.   Oriented to person, place and time.   Skin: Skin is warm, dry and intact. No rash noted. He is not diaphoretic. No cyanosis. Nails show no clubbing.   Psychiatric: He has a normal mood and affect. His speech is normal and behavior is normal. Judgment and thought content normal. Cognition and memory are normal.     Vitals:    06/17/19 1253   BP: 130/68   Pulse: 60   Weight: 115 kg (253 lb)   Height: 185.4 cm (73\")           Lab Review:       Assessment:          Diagnosis Plan   1. Essential hypertension  Basic Metabolic Panel   2. Paroxysmal atrial fibrillation (CMS/HCC)     3. Morbidly obese (CMS/HCC)     4. History of GI bleed            Plan:       1.  Essential hypertension-controlled.  He is currently not on his lisinopril.  I am to start him on Lasix 20 mg p.o. x7 days.  This for lower extremity edema.  I will have him check a BMP in 1 week.  He will call me and let me know how he is feeling and if the fluid has gone down.  We will assess the need for continuation of a diuretic at that time.    2 paroxysmal atrial fibrillation- he is back in normal sinus rhythm, rate controlled.  He is currently not on any anticoagulation.  Atrial Fibrillation and Atrial Flutter  Assessment  • The patient's CHADS2-VASc score is 2  • A [id number]-[id number] score of 2 or more is considered a high risk for a thromboembolic event  • Warfarin prescribed    Plan  • Attempt to maintain sinus rhythm  • Continue beta blocker for rhythm control    3.  Morbid obesity-she would benefit from a weight loss program    4.  History of GI bleed - 6/19.  He is currently off anticoagulation.  He has appointment with Dr. Medley in a couple weeks.  Will await clearance from GI before restarting anticoagulation " medication.      He will call me in a week and let me know how the swelling is in his bilateral feet and ankles.    Floyd Richards, AMBREEN      Current Outpatient Medications:   •  metoprolol succinate XL (TOPROL-XL) 100 MG 24 hr tablet, Take 1 tablet by mouth Daily., Disp: 90 tablet, Rfl: 3  •  pantoprazole (PROTONIX) 40 MG EC tablet, Take 40 mg by mouth 2 (Two) Times a Day., Disp: , Rfl:   •  sotalol (BETAPACE) 160 MG tablet, TAKE 1 TABLET BY MOUTH TWICE DAILY, Disp: 90 tablet, Rfl: 1  •  furosemide (LASIX) 20 MG tablet, Take 1 tablet by mouth Daily., Disp: 30 tablet, Rfl: 11  •  lisinopril (PRINIVIL,ZESTRIL) 10 MG tablet, Take 1 tablet by mouth Daily., Disp: 90 tablet, Rfl: 3  •  rivaroxaban (XARELTO) 20 MG tablet, Take 1 tablet by mouth Daily. with food., Disp: 90 tablet, Rfl: 3

## 2019-06-21 ENCOUNTER — TELEPHONE (OUTPATIENT)
Dept: CARDIOLOGY | Facility: CLINIC | Age: 69
End: 2019-06-21

## 2019-06-21 NOTE — TELEPHONE ENCOUNTER
Pt called for update since he started taking lasix 20 mg QD . Pt states that his right foot still some what swelling but he think the medication is working......Xiomara FREDERICK

## 2019-06-24 ENCOUNTER — LAB (OUTPATIENT)
Dept: LAB | Facility: HOSPITAL | Age: 69
End: 2019-06-24

## 2019-06-24 DIAGNOSIS — I10 ESSENTIAL HYPERTENSION: Chronic | ICD-10-CM

## 2019-06-24 LAB
ANION GAP SERPL CALCULATED.3IONS-SCNC: 12.8 MMOL/L
BUN BLD-MCNC: 13 MG/DL (ref 8–23)
BUN/CREAT SERPL: 13.5 (ref 7–25)
CALCIUM SPEC-SCNC: 8.8 MG/DL (ref 8.6–10.5)
CHLORIDE SERPL-SCNC: 102 MMOL/L (ref 98–107)
CO2 SERPL-SCNC: 23.2 MMOL/L (ref 22–29)
CREAT BLD-MCNC: 0.96 MG/DL (ref 0.76–1.27)
GFR SERPL CREATININE-BSD FRML MDRD: 78 ML/MIN/1.73
GLUCOSE BLD-MCNC: 115 MG/DL (ref 65–99)
POTASSIUM BLD-SCNC: 4.1 MMOL/L (ref 3.5–5.2)
SODIUM BLD-SCNC: 138 MMOL/L (ref 136–145)

## 2019-06-24 PROCEDURE — 36415 COLL VENOUS BLD VENIPUNCTURE: CPT

## 2019-06-24 PROCEDURE — 80048 BASIC METABOLIC PNL TOTAL CA: CPT

## 2019-06-26 ENCOUNTER — DOCUMENTATION (OUTPATIENT)
Dept: CARDIOLOGY | Facility: CLINIC | Age: 69
End: 2019-06-26

## 2019-06-26 NOTE — PROGRESS NOTES
Spoke to patient.  He states that he still has swelling in his right leg.  He would like to continue on the Lasix for now.  He has not seen GI yet his appointment is in August but he is on the list for cancellations.  We will continue off his Xarelto until cleared by GI.

## 2019-08-07 ENCOUNTER — OFFICE VISIT (OUTPATIENT)
Dept: CARDIOLOGY | Facility: CLINIC | Age: 69
End: 2019-08-07

## 2019-08-07 VITALS
WEIGHT: 259 LBS | DIASTOLIC BLOOD PRESSURE: 76 MMHG | SYSTOLIC BLOOD PRESSURE: 140 MMHG | HEART RATE: 56 BPM | BODY MASS INDEX: 34.33 KG/M2 | HEIGHT: 73 IN

## 2019-08-07 DIAGNOSIS — I48.0 PAROXYSMAL ATRIAL FIBRILLATION (HCC): Primary | Chronic | ICD-10-CM

## 2019-08-07 DIAGNOSIS — E66.01 MORBIDLY OBESE (HCC): ICD-10-CM

## 2019-08-07 DIAGNOSIS — I10 ESSENTIAL HYPERTENSION: Chronic | ICD-10-CM

## 2019-08-07 PROCEDURE — 99213 OFFICE O/P EST LOW 20 MIN: CPT | Performed by: INTERNAL MEDICINE

## 2019-08-07 NOTE — PROGRESS NOTES
Subjective:     Encounter Date: 8/7/2019      Patient ID: Hakeem Onofre Jr. is a 68 y.o. male.    Chief Complaint: PAF  History of Present Illness      I had the pleasure of seeing this patient in the office today for follow-up of his cardiac status.  Patient has a history of paroxysmal atrial fibrillation. He had a cardioversion in 2017.    He presents today in follow-up to recent hospitalization at [facility].    Seen in our office in June to follow-up from that hospitalization as well.  He states he and his wife were in [facility] to meet their new grandchild.  The day they were to leave, the patient had 3 episodes of nausea, vomiting and diarrhea.  By the third episode he saw blood in his vomitus and in his stool.  His son called EMS and he was taken to the hospital.  He was admitted to the ICU with coffee-ground emesis, dark stools and found to have supratherapeutic INR on Xarelto, bradycardic (HR 49), hypotension (70/44).  He underwent an EGD that showed acute gastritis and non-bleeding 1.2 cm gastric ulcer that was clipped.  He received a total of 4 units of packed red blood cells as well as 1 unit of FFP.  His course was complicated by atrial flutter with RVR and he was started back on metoprolol and sotalol.  He was then restarted on a heparin drip for anticoagulation after GI clearance but patient unfortunately had a bloody bowel movement the same day after which time the heparin was stopped.  He had not had a previous GI bleeding history and states he was started on Xarelto about 5 years ago without any bleeding complications.  He had had a colonoscopy perhaps 10 years ago which showed polyps that were resected but never had an EGD before this admission.     H. pylori serology was positive, stool Ag negative.  He was instructed to have a repeat EGD in 6 to 8 weeks to assess the healing of his gastric ulcer.  He was started on Protonix 40 mg twice daily.  His hemoglobin day of  discharge was 7.3.      Patient was seen in our office in June by Floyd GUERRA.  Since then he has been doing well from a cardiac standpoint.  He has not felt any palpitations tachycardia.  He has not felt that he is had any recurrent atrial fibrillation.  He has not seen any GI bleeding.  He is scheduled to be seen tomorrow by Dr. Medley.  He is not yet cleared to resume blood thinners.  He does not think that he is had a CBC drawn recently.  We did do a BMP back in June to follow-up on his low-dose furosemide.  He has not had any more problems with edema.  No chest pain or chest discomfort.  No shortness of breath.    Patient also has a history of atrial flutter that was diagnosed in 2012 and at that point he underwent atrial flutter ablation.    The following portions of the patient's history were reviewed and updated as appropriate: allergies, current medications, past family history, past medical history, past social history, past surgical history and problem list.    Past Medical History:   Diagnosis Date   • Arrhythmia    • Atrial fibrillation (CMS/HCC)    • Hypertension        Past Surgical History:   Procedure Laterality Date   • ABLATION OF DYSRHYTHMIC FOCUS  2012    for treatment of Afib/flutter   • CARDIAC CATHETERIZATION     • CATARACT EXTRACTION         Social History     Socioeconomic History   • Marital status:      Spouse name: Not on file   • Number of children: Not on file   • Years of education: Not on file   • Highest education level: Not on file   Tobacco Use   • Smoking status: Never Smoker   • Tobacco comment: caffiene occ   Substance and Sexual Activity   • Alcohol use: Yes     Alcohol/week: 1.8 oz     Types: 3 Cans of beer per week     Comment: occ   • Drug use: No   • Sexual activity: Defer       Review of Systems   Constitution: Negative for chills, decreased appetite, fever and night sweats.   HENT: Negative for ear discharge, ear pain, hearing loss, nosebleeds and sore  "throat.    Eyes: Negative for blurred vision, double vision and pain.   Cardiovascular: Negative for cyanosis.   Respiratory: Negative for hemoptysis and sputum production.    Endocrine: Negative for cold intolerance and heat intolerance.   Hematologic/Lymphatic: Negative for adenopathy.   Skin: Negative for dry skin, itching, nail changes, rash and suspicious lesions.   Musculoskeletal: Negative for arthritis, gout, muscle cramps, muscle weakness, myalgias and neck pain.   Gastrointestinal: Negative for anorexia, bowel incontinence, constipation, diarrhea, dysphagia, hematemesis and jaundice.   Genitourinary: Negative for bladder incontinence, dysuria, flank pain, frequency, hematuria and nocturia.   Neurological: Negative for focal weakness, numbness, paresthesias and seizures.   Psychiatric/Behavioral: Negative for altered mental status, hallucinations, hypervigilance, suicidal ideas and thoughts of violence.   Allergic/Immunologic: Negative for persistent infections.       Procedures       Objective:     Vitals:    08/07/19 1017   BP: 140/76   Pulse: 56   Weight: 117 kg (259 lb)   Height: 185.4 cm (73\")         Physical Exam   Constitutional: He is oriented to person, place, and time. He appears well-developed and well-nourished. No distress.   HENT:   Head: Normocephalic and atraumatic.   Nose: Nose normal.   Mouth/Throat: Oropharynx is clear and moist.   Eyes: Conjunctivae and EOM are normal. Pupils are equal, round, and reactive to light. Right eye exhibits no discharge. Left eye exhibits no discharge.   Neck: Normal range of motion. Neck supple. No tracheal deviation present. No thyromegaly present.   Cardiovascular: Normal rate, regular rhythm, S1 normal, S2 normal, normal heart sounds and normal pulses. Exam reveals no S3.   Pulmonary/Chest: Effort normal and breath sounds normal. No stridor. No respiratory distress. He exhibits no tenderness.   Abdominal: Soft. Bowel sounds are normal. He exhibits no " distension and no mass. There is no tenderness. There is no rebound and no guarding.   Musculoskeletal: Normal range of motion. He exhibits no tenderness or deformity.   Lymphadenopathy:     He has no cervical adenopathy.   Neurological: He is alert and oriented to person, place, and time. He has normal reflexes.   Skin: Skin is warm and dry. No rash noted. He is not diaphoretic. No erythema.   Psychiatric: He has a normal mood and affect. Thought content normal.       Lab Review:           performed      Assessment:          Diagnosis Plan   1. Paroxysmal atrial fibrillation (CMS/HCC)     2. Essential hypertension     3. Morbidly obese (CMS/HCC)            Plan:       1. Atrial Fibrillation and Atrial Flutter  Assessment  • The patient has paroxysmal atrial fibrillation  • This is non-valvular in etiology  • The patient's CHADS2-VASc score is 2  • A [id number]-YMIs score of 2 or more is considered a high risk for a thromboembolic event  • Rivaroxaban not prescribed for medical reasons    Plan  • Attempt to maintain sinus rhythm  • Continue sotalol for rhythm control    2.  GI bleed- scheduled to follow-up with Dr. Medley.  Currently not on anticoagulation because of his life-threatening GI bleed and H. pylori  3.  Anticoagulation- will await clearance for consideration of resumption of anticoagulation.  I am also gave him to check prices for Xarelto and Eliquis so we will have that information if and when we can resume anticoagulation.    Thank you very much for allowing us to participate in the care of this pleasant patient.  Please don't hesitate to call if I can be of assistance in any way.      Current Outpatient Medications:   •  furosemide (LASIX) 20 MG tablet, Take 1 tablet by mouth Daily., Disp: 30 tablet, Rfl: 11  •  metoprolol succinate XL (TOPROL-XL) 100 MG 24 hr tablet, Take 1 tablet by mouth Daily., Disp: 90 tablet, Rfl: 3  •  sotalol (BETAPACE) 160 MG tablet, TAKE 1 TABLET BY MOUTH TWICE DAILY, Disp:  90 tablet, Rfl: 1  •  pantoprazole (PROTONIX) 40 MG EC tablet, Take 40 mg by mouth 2 (Two) Times a Day., Disp: , Rfl:

## 2019-08-08 ENCOUNTER — OFFICE VISIT (OUTPATIENT)
Dept: GASTROENTEROLOGY | Facility: CLINIC | Age: 69
End: 2019-08-08

## 2019-08-08 ENCOUNTER — APPOINTMENT (OUTPATIENT)
Dept: LAB | Facility: HOSPITAL | Age: 69
End: 2019-08-08

## 2019-08-08 VITALS — HEIGHT: 73 IN | BODY MASS INDEX: 34.22 KG/M2 | WEIGHT: 258.2 LBS

## 2019-08-08 DIAGNOSIS — Z86.010 PERSONAL HISTORY OF COLONIC POLYPS: ICD-10-CM

## 2019-08-08 DIAGNOSIS — D62 ACUTE POST-HEMORRHAGIC ANEMIA: Primary | ICD-10-CM

## 2019-08-08 PROBLEM — Z86.0100 PERSONAL HISTORY OF COLONIC POLYPS: Status: ACTIVE | Noted: 2019-08-08

## 2019-08-08 LAB
BASOPHILS # BLD AUTO: 0.03 10*3/MM3 (ref 0–0.2)
BASOPHILS NFR BLD AUTO: 0.3 % (ref 0–1.5)
DEPRECATED RDW RBC AUTO: 50.4 FL (ref 37–54)
EOSINOPHIL # BLD AUTO: 0.34 10*3/MM3 (ref 0–0.4)
EOSINOPHIL NFR BLD AUTO: 3.3 % (ref 0.3–6.2)
ERYTHROCYTE [DISTWIDTH] IN BLOOD BY AUTOMATED COUNT: 18.3 % (ref 12.3–15.4)
HCT VFR BLD AUTO: 32.3 % (ref 37.5–51)
HGB BLD-MCNC: 8.9 G/DL (ref 13–17.7)
IMM GRANULOCYTES # BLD AUTO: 0.03 10*3/MM3 (ref 0–0.05)
IMM GRANULOCYTES NFR BLD AUTO: 0.3 % (ref 0–0.5)
LYMPHOCYTES # BLD AUTO: 2.05 10*3/MM3 (ref 0.7–3.1)
LYMPHOCYTES NFR BLD AUTO: 19.8 % (ref 19.6–45.3)
MCH RBC QN AUTO: 21.1 PG (ref 26.6–33)
MCHC RBC AUTO-ENTMCNC: 27.6 G/DL (ref 31.5–35.7)
MCV RBC AUTO: 76.7 FL (ref 79–97)
MONOCYTES # BLD AUTO: 1.34 10*3/MM3 (ref 0.1–0.9)
MONOCYTES NFR BLD AUTO: 12.9 % (ref 5–12)
NEUTROPHILS # BLD AUTO: 6.57 10*3/MM3 (ref 1.7–7)
NEUTROPHILS NFR BLD AUTO: 63.4 % (ref 42.7–76)
NRBC BLD AUTO-RTO: 0 /100 WBC (ref 0–0.2)
PLATELET # BLD AUTO: 311 10*3/MM3 (ref 140–450)
PMV BLD AUTO: 11.6 FL (ref 6–12)
RBC # BLD AUTO: 4.21 10*6/MM3 (ref 4.14–5.8)
WBC NRBC COR # BLD: 10.36 10*3/MM3 (ref 3.4–10.8)

## 2019-08-08 PROCEDURE — 99204 OFFICE O/P NEW MOD 45 MIN: CPT | Performed by: INTERNAL MEDICINE

## 2019-08-08 PROCEDURE — 85025 COMPLETE CBC W/AUTO DIFF WBC: CPT | Performed by: INTERNAL MEDICINE

## 2019-08-08 PROCEDURE — 36415 COLL VENOUS BLD VENIPUNCTURE: CPT | Performed by: INTERNAL MEDICINE

## 2019-08-08 RX ORDER — PANTOPRAZOLE SODIUM 40 MG/1
40 TABLET, DELAYED RELEASE ORAL DAILY
Qty: 90 TABLET | Refills: 3 | Status: SHIPPED | OUTPATIENT
Start: 2019-08-08 | End: 2020-08-17

## 2019-08-08 NOTE — H&P (VIEW-ONLY)
PATIENT INFORMATION  Hakeem Onofre Jr.       - 1950    CHIEF COMPLAINT  Chief Complaint   Patient presents with   • Follow-up     GI Bleed       HISTORY OF PRESENT ILLNESS  Here for GI follow up of an OOT Gastric ulcer that bled in [facility] and he received 2 u PRBCs andhas done well.    Cant find a repeat CBC her but is 8-9 weeks out from his EGD.    Reviewed his positive HP serology w negative stool Ag and he was taking NSAIDS despite being on Xarelto for his AFIB.    Will recheck EGD with Biopsy as well as being able to clear him for Xarelto agin ASAP    Has had polyps by [facility] in the past and is > 5 years form his last.            REVIEW OF SYSTEMS  Review of Systems   All other systems reviewed and are negative.        ACTIVE PROBLEMS  Patient Active Problem List    Diagnosis   • Morbidly obese (CMS/HCC) [E66.01]   • History of GI bleed [Z87.19]   • Atrial fibrillation (CMS/HCC) [I48.91]   • Hypertension [I10]         PAST MEDICAL HISTORY  Past Medical History:   Diagnosis Date   • Arrhythmia    • Atrial fibrillation (CMS/HCC)    • Hypertension          SURGICAL HISTORY  Past Surgical History:   Procedure Laterality Date   • ABLATION OF DYSRHYTHMIC FOCUS      for treatment of Afib/flutter   • CARDIAC CATHETERIZATION     • CATARACT EXTRACTION           FAMILY HISTORY  Family History   Problem Relation Age of Onset   • Heart attack Father    • Hypertension Father    • Heart disease Father          SOCIAL HISTORY  Social History     Occupational History   • Not on file   Tobacco Use   • Smoking status: Never Smoker   • Tobacco comment: caffiene occ   Substance and Sexual Activity   • Alcohol use: Yes     Alcohol/week: 1.8 oz     Types: 3 Cans of beer per week     Comment: occ   • Drug use: No   • Sexual activity: Defer       Debilities/Disabilities Identified: None    Emotional Behavior: Appropriate    CURRENT MEDICATIONS    Current Outpatient Medications:   •  furosemide (LASIX) 20 MG  "tablet, Take 1 tablet by mouth Daily., Disp: 30 tablet, Rfl: 11  •  metoprolol succinate XL (TOPROL-XL) 100 MG 24 hr tablet, Take 1 tablet by mouth Daily., Disp: 90 tablet, Rfl: 3  •  pantoprazole (PROTONIX) 40 MG EC tablet, Take 40 mg by mouth 2 (Two) Times a Day., Disp: , Rfl:   •  pantoprazole (PROTONIX) 40 MG EC tablet, Take 1 tablet by mouth Daily., Disp: 90 tablet, Rfl: 3  •  sotalol (BETAPACE) 160 MG tablet, TAKE 1 TABLET BY MOUTH TWICE DAILY, Disp: 90 tablet, Rfl: 1    ALLERGIES  Patient has no known allergies.    VITALS  Vitals:    08/08/19 1104   Weight: 117 kg (258 lb 3.2 oz)   Height: 185.4 cm (73\")       LAST RESULTS   Lab on 06/24/2019   Component Date Value Ref Range Status   • Glucose 06/24/2019 115* 65 - 99 mg/dL Final   • BUN 06/24/2019 13  8 - 23 mg/dL Final   • Creatinine 06/24/2019 0.96  0.76 - 1.27 mg/dL Final   • Sodium 06/24/2019 138  136 - 145 mmol/L Final   • Potassium 06/24/2019 4.1  3.5 - 5.2 mmol/L Final   • Chloride 06/24/2019 102  98 - 107 mmol/L Final   • CO2 06/24/2019 23.2  22.0 - 29.0 mmol/L Final   • Calcium 06/24/2019 8.8  8.6 - 10.5 mg/dL Final   • eGFR Non African Amer 06/24/2019 78  >60 mL/min/1.73 Final   • BUN/Creatinine Ratio 06/24/2019 13.5  7.0 - 25.0 Final   • Anion Gap 06/24/2019 12.8  mmol/L Final     No results found.    PHYSICAL EXAM  Physical Exam   Constitutional: He is oriented to person, place, and time. He appears well-developed and well-nourished.   HENT:   Head: Normocephalic and atraumatic.   Eyes: Conjunctivae and EOM are normal. Pupils are equal, round, and reactive to light. No scleral icterus.   Neck: Normal range of motion. Neck supple. No thyromegaly present.   Cardiovascular: Normal rate, regular rhythm, normal heart sounds and intact distal pulses. Exam reveals no gallop.   No murmur heard.  Pulmonary/Chest: Effort normal and breath sounds normal. He has no wheezes. He has no rales.   Abdominal: Soft. Bowel sounds are normal. He exhibits no shifting " dullness, no distension, no fluid wave, no abdominal bruit, no ascites and no mass. There is no hepatosplenomegaly. There is no tenderness. There is no guarding and negative Bravo's sign. Hernia confirmed negative in the ventral area.   Musculoskeletal: Normal range of motion. He exhibits no edema.   Lymphadenopathy:     He has no cervical adenopathy.   Neurological: He is alert and oriented to person, place, and time.   Skin: Skin is warm and dry. No rash noted. He is not diaphoretic. No erythema.   Psychiatric: He has a normal mood and affect. His behavior is normal.       ASSESSMENT  Diagnoses and all orders for this visit:    Acute post-hemorrhagic anemia  -     CBC & Differential  -     Case Request; Standing  -     Case Request    Personal history of colonic polyps  -     Case Request; Standing  -     Case Request    Other orders  -     Follow Anesthesia Guidelines / Standing Orders; Future  -     Obtain Informed Consent; Future  -     Obtain Informed Consent; Standing  -     pantoprazole (PROTONIX) 40 MG EC tablet; Take 1 tablet by mouth Daily.          PLAN  Return if symptoms worsen or fail to improve.

## 2019-08-08 NOTE — PROGRESS NOTES
PATIENT INFORMATION  Hakeem Onofre Jr.       - 1950    CHIEF COMPLAINT  Chief Complaint   Patient presents with   • Follow-up     GI Bleed       HISTORY OF PRESENT ILLNESS  Here for GI follow up of an OOT Gastric ulcer that bled in [facility] and he received 2 u PRBCs andhas done well.    Cant find a repeat CBC her but is 8-9 weeks out from his EGD.    Reviewed his positive HP serology w negative stool Ag and he was taking NSAIDS despite being on Xarelto for his AFIB.    Will recheck EGD with Biopsy as well as being able to clear him for Xarelto agin ASAP    Has had polyps by [facility] in the past and is > 5 years form his last.            REVIEW OF SYSTEMS  Review of Systems   All other systems reviewed and are negative.        ACTIVE PROBLEMS  Patient Active Problem List    Diagnosis   • Morbidly obese (CMS/HCC) [E66.01]   • History of GI bleed [Z87.19]   • Atrial fibrillation (CMS/HCC) [I48.91]   • Hypertension [I10]         PAST MEDICAL HISTORY  Past Medical History:   Diagnosis Date   • Arrhythmia    • Atrial fibrillation (CMS/HCC)    • Hypertension          SURGICAL HISTORY  Past Surgical History:   Procedure Laterality Date   • ABLATION OF DYSRHYTHMIC FOCUS      for treatment of Afib/flutter   • CARDIAC CATHETERIZATION     • CATARACT EXTRACTION           FAMILY HISTORY  Family History   Problem Relation Age of Onset   • Heart attack Father    • Hypertension Father    • Heart disease Father          SOCIAL HISTORY  Social History     Occupational History   • Not on file   Tobacco Use   • Smoking status: Never Smoker   • Tobacco comment: caffiene occ   Substance and Sexual Activity   • Alcohol use: Yes     Alcohol/week: 1.8 oz     Types: 3 Cans of beer per week     Comment: occ   • Drug use: No   • Sexual activity: Defer       Debilities/Disabilities Identified: None    Emotional Behavior: Appropriate    CURRENT MEDICATIONS    Current Outpatient Medications:   •  furosemide (LASIX) 20 MG  "tablet, Take 1 tablet by mouth Daily., Disp: 30 tablet, Rfl: 11  •  metoprolol succinate XL (TOPROL-XL) 100 MG 24 hr tablet, Take 1 tablet by mouth Daily., Disp: 90 tablet, Rfl: 3  •  pantoprazole (PROTONIX) 40 MG EC tablet, Take 40 mg by mouth 2 (Two) Times a Day., Disp: , Rfl:   •  pantoprazole (PROTONIX) 40 MG EC tablet, Take 1 tablet by mouth Daily., Disp: 90 tablet, Rfl: 3  •  sotalol (BETAPACE) 160 MG tablet, TAKE 1 TABLET BY MOUTH TWICE DAILY, Disp: 90 tablet, Rfl: 1    ALLERGIES  Patient has no known allergies.    VITALS  Vitals:    08/08/19 1104   Weight: 117 kg (258 lb 3.2 oz)   Height: 185.4 cm (73\")       LAST RESULTS   Lab on 06/24/2019   Component Date Value Ref Range Status   • Glucose 06/24/2019 115* 65 - 99 mg/dL Final   • BUN 06/24/2019 13  8 - 23 mg/dL Final   • Creatinine 06/24/2019 0.96  0.76 - 1.27 mg/dL Final   • Sodium 06/24/2019 138  136 - 145 mmol/L Final   • Potassium 06/24/2019 4.1  3.5 - 5.2 mmol/L Final   • Chloride 06/24/2019 102  98 - 107 mmol/L Final   • CO2 06/24/2019 23.2  22.0 - 29.0 mmol/L Final   • Calcium 06/24/2019 8.8  8.6 - 10.5 mg/dL Final   • eGFR Non African Amer 06/24/2019 78  >60 mL/min/1.73 Final   • BUN/Creatinine Ratio 06/24/2019 13.5  7.0 - 25.0 Final   • Anion Gap 06/24/2019 12.8  mmol/L Final     No results found.    PHYSICAL EXAM  Physical Exam   Constitutional: He is oriented to person, place, and time. He appears well-developed and well-nourished.   HENT:   Head: Normocephalic and atraumatic.   Eyes: Conjunctivae and EOM are normal. Pupils are equal, round, and reactive to light. No scleral icterus.   Neck: Normal range of motion. Neck supple. No thyromegaly present.   Cardiovascular: Normal rate, regular rhythm, normal heart sounds and intact distal pulses. Exam reveals no gallop.   No murmur heard.  Pulmonary/Chest: Effort normal and breath sounds normal. He has no wheezes. He has no rales.   Abdominal: Soft. Bowel sounds are normal. He exhibits no shifting " dullness, no distension, no fluid wave, no abdominal bruit, no ascites and no mass. There is no hepatosplenomegaly. There is no tenderness. There is no guarding and negative Bravo's sign. Hernia confirmed negative in the ventral area.   Musculoskeletal: Normal range of motion. He exhibits no edema.   Lymphadenopathy:     He has no cervical adenopathy.   Neurological: He is alert and oriented to person, place, and time.   Skin: Skin is warm and dry. No rash noted. He is not diaphoretic. No erythema.   Psychiatric: He has a normal mood and affect. His behavior is normal.       ASSESSMENT  Diagnoses and all orders for this visit:    Acute post-hemorrhagic anemia  -     CBC & Differential  -     Case Request; Standing  -     Case Request    Personal history of colonic polyps  -     Case Request; Standing  -     Case Request    Other orders  -     Follow Anesthesia Guidelines / Standing Orders; Future  -     Obtain Informed Consent; Future  -     Obtain Informed Consent; Standing  -     pantoprazole (PROTONIX) 40 MG EC tablet; Take 1 tablet by mouth Daily.          PLAN  Return if symptoms worsen or fail to improve.

## 2019-08-13 ENCOUNTER — ANESTHESIA EVENT (OUTPATIENT)
Dept: GASTROENTEROLOGY | Facility: HOSPITAL | Age: 69
End: 2019-08-13

## 2019-08-13 ENCOUNTER — ANESTHESIA (OUTPATIENT)
Dept: GASTROENTEROLOGY | Facility: HOSPITAL | Age: 69
End: 2019-08-13

## 2019-08-13 ENCOUNTER — HOSPITAL ENCOUNTER (OUTPATIENT)
Facility: HOSPITAL | Age: 69
Setting detail: HOSPITAL OUTPATIENT SURGERY
Discharge: HOME | End: 2019-08-13
Attending: INTERNAL MEDICINE | Admitting: INTERNAL MEDICINE

## 2019-08-13 VITALS
TEMPERATURE: 98.3 F | WEIGHT: 252.7 LBS | DIASTOLIC BLOOD PRESSURE: 60 MMHG | SYSTOLIC BLOOD PRESSURE: 132 MMHG | HEIGHT: 73 IN | BODY MASS INDEX: 33.49 KG/M2 | HEART RATE: 61 BPM | OXYGEN SATURATION: 98 % | RESPIRATION RATE: 18 BRPM

## 2019-08-13 DIAGNOSIS — D62 ACUTE POST-HEMORRHAGIC ANEMIA: ICD-10-CM

## 2019-08-13 DIAGNOSIS — Z86.010 PERSONAL HISTORY OF COLONIC POLYPS: ICD-10-CM

## 2019-08-13 PROCEDURE — 88305 TISSUE EXAM BY PATHOLOGIST: CPT | Performed by: INTERNAL MEDICINE

## 2019-08-13 PROCEDURE — 43239 EGD BIOPSY SINGLE/MULTIPLE: CPT | Performed by: INTERNAL MEDICINE

## 2019-08-13 PROCEDURE — 45385 COLONOSCOPY W/LESION REMOVAL: CPT | Performed by: INTERNAL MEDICINE

## 2019-08-13 PROCEDURE — 25010000002 PROPOFOL 10 MG/ML EMULSION: Performed by: ANESTHESIOLOGY

## 2019-08-13 PROCEDURE — 45380 COLONOSCOPY AND BIOPSY: CPT | Performed by: INTERNAL MEDICINE

## 2019-08-13 RX ORDER — PROPOFOL 10 MG/ML
VIAL (ML) INTRAVENOUS AS NEEDED
Status: DISCONTINUED | OUTPATIENT
Start: 2019-08-13 | End: 2019-08-13 | Stop reason: SURG

## 2019-08-13 RX ORDER — LIDOCAINE HYDROCHLORIDE 20 MG/ML
INJECTION, SOLUTION INFILTRATION; PERINEURAL AS NEEDED
Status: DISCONTINUED | OUTPATIENT
Start: 2019-08-13 | End: 2019-08-13 | Stop reason: SURG

## 2019-08-13 RX ORDER — PROPOFOL 10 MG/ML
VIAL (ML) INTRAVENOUS CONTINUOUS PRN
Status: DISCONTINUED | OUTPATIENT
Start: 2019-08-13 | End: 2019-08-13 | Stop reason: SURG

## 2019-08-13 RX ORDER — SODIUM CHLORIDE, SODIUM LACTATE, POTASSIUM CHLORIDE, CALCIUM CHLORIDE 600; 310; 30; 20 MG/100ML; MG/100ML; MG/100ML; MG/100ML
1000 INJECTION, SOLUTION INTRAVENOUS CONTINUOUS
Status: DISCONTINUED | OUTPATIENT
Start: 2019-08-13 | End: 2019-08-13 | Stop reason: HOSPADM

## 2019-08-13 RX ADMIN — PROPOFOL 125 MG: 10 INJECTION, EMULSION INTRAVENOUS at 14:21

## 2019-08-13 RX ADMIN — SODIUM CHLORIDE, POTASSIUM CHLORIDE, SODIUM LACTATE AND CALCIUM CHLORIDE 1000 ML: 600; 310; 30; 20 INJECTION, SOLUTION INTRAVENOUS at 13:35

## 2019-08-13 RX ADMIN — LIDOCAINE HYDROCHLORIDE 50 MG: 20 INJECTION, SOLUTION INFILTRATION; PERINEURAL at 14:21

## 2019-08-13 RX ADMIN — PROPOFOL 140 MCG/KG/MIN: 10 INJECTION, EMULSION INTRAVENOUS at 14:21

## 2019-08-13 NOTE — DISCHARGE INSTRUCTIONS
For the next 24 hours patient needs to be with a responsible adult.    For 24 hours DO NOT drive, operate machinery, appliances, drink alcohol, make important decisions or sign legal documents.    Start with a light or bland diet if you are feeling sick to your stomach otherwise advance to regular diet as tolerated.    Follow recommendations on procedure report if provided by your doctor.    Call Dr Medley for problems [phone]    Problems may include but not limited to: large amounts of bleeding, trouble breathing, repeated vomiting, severe unrelieved pain, fever or chills.

## 2019-08-13 NOTE — BRIEF OP NOTE
ESOPHAGOGASTRODUODENOSCOPY, COLONOSCOPY  Progress Note    Hakeem Onofre JrVerna  8/13/2019    Pre-op Diagnosis:   Acute post-hemorrhagic anemia [D62]  Personal history of colonic polyps [Z86.010]       Post-Op Diagnosis Codes:     * Acute post-hemorrhagic anemia [D62]     * Personal history of colonic polyps [Z86.010]     * Gastric ulcer [531]     * Reflux esophagitis [K21.0]     * Colon polyp [K63.5]     * Diverticulosis large intestine w/o perforation or abscess w/o bleeding [K57.30]     * Lipoma of colon [D17.5]    Procedure/CPT® Codes:      Procedure(s):  ESOPHAGOGASTRODUODENOSCOPY WITH COLD BIOPSIES  COLONOSCOPY TO CECUM AND TI WITH COLD SNARE POLYPECTOMIES    Surgeon(s):  Duc Medley MD    Anesthesia: Monitored Anesthesia Care    Staff:   Endo Technician: Sandy Dean  Endo Nurse: Elizabeth Santo RN    Estimated Blood Loss: none    Urine Voided: * No values recorded between 8/13/2019  2:19 PM and 8/13/2019  2:59 PM *    Specimens:                Specimens     ID Source Type Tests Collected By Collected At Frozen?      A Gastric, Antrum Tissue · TISSUE PATHOLOGY EXAM   Duc Medley MD 8/13/19 1427 No     Description: GASTRIC ULCER BIOSPIES    B Esophagus, Distal Tissue · TISSUE PATHOLOGY EXAM   Duc Medley MD 8/13/19 1428 No     Description: DISTAL ESOPHAGUS BIOPSIES    C Large Intestine, Cecum Polyp · TISSUE PATHOLOGY EXAM   Duc Medley MD 8/13/19 1442 No     Description: ILEO-CECAL VALVE POLYP    D Large Intestine, Right / Ascending Colon Polyp · TISSUE PATHOLOGY EXAM   Duc Medley MD 8/13/19 1447 No     Description: HEPATIC FLEXURE POLYP    E Large Intestine, Left / Descending Colon Polyp · TISSUE PATHOLOGY EXAM   Duc Medley MD 8/13/19 1453 No     Description: DESCENDING COLON POLYP    F Large Intestine, Transverse Colon Polyp · TISSUE PATHOLOGY EXAM   Duc Medley MD 8/13/19 1454 No      Description: TRANSVERSE COLON POLYP                Drains:      Findings: Normal Duodenum  Healing -Biopsy  Reflux Esophagitis-Biopsy    Colon to TI good Prep  Sigmoid Diverticulosis  Polyps (4) Cold Snare/Biopsy    Complications: None      Duc Medley MD     Date: 8/13/2019  Time: 2:59 PM

## 2019-08-13 NOTE — ANESTHESIA POSTPROCEDURE EVALUATION
"Patient: Hakeem Onofre Jr.    Procedure Summary     Date:  08/13/19 Room / Location:  [facility] ENDOSCOPY 9 /  [facility]    Anesthesia Start:  1416 Anesthesia Stop:  1459    Procedures:       ESOPHAGOGASTRODUODENOSCOPY WITH COLD BIOPSIES (N/A Esophagus)      COLONOSCOPY TO CECUM AND TI WITH COLD SNARE POLYPECTOMIES (N/A ) Diagnosis:       Acute post-hemorrhagic anemia      Personal history of colonic polyps      Gastric ulcer      Reflux esophagitis      Colon polyp      Diverticulosis large intestine w/o perforation or abscess w/o bleeding      Lipoma of colon      (Acute post-hemorrhagic anemia [D62])      (Personal history of colonic polyps [Z86.010])    Surgeon:  Duc Medley MD Provider:  Efrne Driscoll MD    Anesthesia Type:  MAC ASA Status:  3          Anesthesia Type: MAC  Last vitals  BP   132/60 (08/13/19 1521)   Temp   36.8 °C (98.3 °F) (08/13/19 1320)   Pulse   61 (08/13/19 1521)   Resp   18 (08/13/19 1521)     SpO2   98 % (08/13/19 1521)     Post Anesthesia Care and Evaluation    Patient location during evaluation: bedside  Patient participation: complete - patient participated  Level of consciousness: awake and alert  Pain score: 0  Pain management: adequate  Airway patency: patent  Anesthetic complications: No anesthetic complications    Cardiovascular status: acceptable  Respiratory status: acceptable  Hydration status: acceptable    Comments: /60 (BP Location: Left arm, Patient Position: Lying)   Pulse 61   Temp 36.8 °C (98.3 °F) (Oral)   Resp 18   Ht 185.4 cm (73\")   Wt 115 kg (252 lb 11.2 oz)   SpO2 98%   BMI 33.34 kg/m²       "

## 2019-08-13 NOTE — INTERVAL H&P NOTE
"Vital Signs  /66 (BP Location: Left arm, Patient Position: Lying)   Pulse 63   Temp 98.3 °F (36.8 °C) (Oral)   Resp 16   Ht 185.4 cm (73\")   Wt 115 kg (252 lb 11.2 oz)   SpO2 99%   BMI 33.34 kg/m²     H&P reviewed. The patient was examined and there are no changes to the H&P.        "

## 2019-08-13 NOTE — ANESTHESIA PREPROCEDURE EVALUATION
Anesthesia Evaluation     Patient summary reviewed   NPO Solid Status: > 8 hours  NPO Liquid Status: > 8 hours           Airway   Mallampati: III  TM distance: >3 FB  Neck ROM: full  Possible difficult intubation  Dental      Pulmonary     breath sounds clear to auscultation  Cardiovascular   Exercise tolerance: good (4-7 METS)    Rhythm: regular  Rate: normal        Neuro/Psych  GI/Hepatic/Renal/Endo      Musculoskeletal     Abdominal    Substance History      OB/GYN          Other                        Anesthesia Plan    ASA 3     MAC     intravenous induction   Anesthetic plan, all risks, benefits, and alternatives have been provided, discussed and informed consent has been obtained with: patient.

## 2019-08-15 LAB
CYTO UR: NORMAL
LAB AP CASE REPORT: NORMAL
PATH REPORT.FINAL DX SPEC: NORMAL
PATH REPORT.GROSS SPEC: NORMAL

## 2019-08-28 PROBLEM — K25.9 GASTRIC ULCER WITHOUT HEMORRHAGE OR PERFORATION: Status: ACTIVE | Noted: 2019-08-28

## 2019-09-08 DIAGNOSIS — I48.0 PAROXYSMAL ATRIAL FIBRILLATION (HCC): ICD-10-CM

## 2019-09-09 RX ORDER — SOTALOL HYDROCHLORIDE 160 MG/1
TABLET ORAL
Qty: 90 TABLET | Refills: 1 | Status: SHIPPED | OUTPATIENT
Start: 2019-09-09 | End: 2019-12-07 | Stop reason: SDUPTHER

## 2019-09-30 ENCOUNTER — APPOINTMENT (OUTPATIENT)
Dept: LAB | Facility: HOSPITAL | Age: 69
End: 2019-09-30

## 2019-09-30 ENCOUNTER — OFFICE VISIT (OUTPATIENT)
Dept: GASTROENTEROLOGY | Facility: CLINIC | Age: 69
End: 2019-09-30

## 2019-09-30 VITALS — BODY MASS INDEX: 34.06 KG/M2 | HEIGHT: 73 IN | WEIGHT: 257 LBS

## 2019-09-30 DIAGNOSIS — K25.3 ACUTE GASTRIC ULCER WITHOUT HEMORRHAGE OR PERFORATION: ICD-10-CM

## 2019-09-30 DIAGNOSIS — D62 ACUTE POST-HEMORRHAGIC ANEMIA: Primary | ICD-10-CM

## 2019-09-30 DIAGNOSIS — I48.0 PAROXYSMAL ATRIAL FIBRILLATION (HCC): Chronic | ICD-10-CM

## 2019-09-30 LAB
ANISOCYTOSIS BLD QL: NORMAL
BASOPHILS # BLD MANUAL: 0.1 10*3/MM3 (ref 0–0.2)
BASOPHILS NFR BLD AUTO: 1 % (ref 0–1.5)
DEPRECATED RDW RBC AUTO: 61.5 FL (ref 37–54)
ERYTHROCYTE [DISTWIDTH] IN BLOOD BY AUTOMATED COUNT: 22.5 % (ref 12.3–15.4)
HCT VFR BLD AUTO: 41 % (ref 37.5–51)
HGB BLD-MCNC: 12.2 G/DL (ref 13–17.7)
LYMPHOCYTES # BLD MANUAL: 2.36 10*3/MM3 (ref 0.7–3.1)
LYMPHOCYTES NFR BLD MANUAL: 23.7 % (ref 19.6–45.3)
LYMPHOCYTES NFR BLD MANUAL: 5.2 % (ref 5–12)
MCH RBC QN AUTO: 23.2 PG (ref 26.6–33)
MCHC RBC AUTO-ENTMCNC: 29.8 G/DL (ref 31.5–35.7)
MCV RBC AUTO: 77.9 FL (ref 79–97)
MONOCYTES # BLD AUTO: 0.52 10*3/MM3 (ref 0.1–0.9)
NEUTROPHILS # BLD AUTO: 6.98 10*3/MM3 (ref 1.7–7)
NEUTROPHILS NFR BLD MANUAL: 70.1 % (ref 42.7–76)
PLAT MORPH BLD: NORMAL
PLATELET # BLD AUTO: 262 10*3/MM3 (ref 140–450)
PMV BLD AUTO: 12.1 FL (ref 6–12)
RBC # BLD AUTO: 5.26 10*6/MM3 (ref 4.14–5.8)
WBC MORPH BLD: NORMAL
WBC NRBC COR # BLD: 9.96 10*3/MM3 (ref 3.4–10.8)

## 2019-09-30 PROCEDURE — 85025 COMPLETE CBC W/AUTO DIFF WBC: CPT | Performed by: INTERNAL MEDICINE

## 2019-09-30 PROCEDURE — 36415 COLL VENOUS BLD VENIPUNCTURE: CPT | Performed by: INTERNAL MEDICINE

## 2019-09-30 PROCEDURE — 85007 BL SMEAR W/DIFF WBC COUNT: CPT | Performed by: INTERNAL MEDICINE

## 2019-09-30 PROCEDURE — 99213 OFFICE O/P EST LOW 20 MIN: CPT | Performed by: INTERNAL MEDICINE

## 2019-09-30 NOTE — PROGRESS NOTES
PATIENT INFORMATION  Hakeem Onofre Jr.       - 1950    CHIEF COMPLAINT  Chief Complaint   Patient presents with   • Follow-up     8 week follow up on EGD       HISTORY OF PRESENT ILLNESS  Fells well and is back to normal diet , BMs , and activity any post hosp edema is resolbved    Is off lasix but IS on 2 Milli blockers Daily PPI and an Iron supplement            REVIEW OF SYSTEMS  Review of Systems   All other systems reviewed and are negative.        ACTIVE PROBLEMS  Patient Active Problem List    Diagnosis   • Gastric ulcer without hemorrhage or perforation [K25.9]   • Acute post-hemorrhagic anemia [D62]   • Personal history of colonic polyps [Z86.010]   • Morbidly obese (CMS/HCC) [E66.01]   • History of GI bleed [Z87.19]   • Atrial fibrillation (CMS/HCC) [I48.91]   • Hypertension [I10]         PAST MEDICAL HISTORY  Past Medical History:   Diagnosis Date   • Arrhythmia    • Atrial fibrillation (CMS/HCC)    • Colon polyp    • Gastric ulcer    • Gastric ulcer    • GERD (gastroesophageal reflux disease)    • History of transfusion 2019    gastric ulcer   • Hypertension    • Sleep apnea     uses cpap         SURGICAL HISTORY  Past Surgical History:   Procedure Laterality Date   • ABLATION OF DYSRHYTHMIC FOCUS      for treatment of Afib/flutter   • CARDIAC CATHETERIZATION     • CATARACT EXTRACTION     • COLONOSCOPY N/A 2019    Procedure: COLONOSCOPY TO CECUM AND TI WITH COLD SNARE POLYPECTOMIES;  Surgeon: Duc Medley MD;  Location: [facility] ENDOSCOPY;  Service: Gastroenterology   • ENDOSCOPY     • ENDOSCOPY N/A 2019    Procedure: ESOPHAGOGASTRODUODENOSCOPY WITH COLD BIOPSIES;  Surgeon: Duc Medley MD;  Location: [facility] ENDOSCOPY;  Service: Gastroenterology         FAMILY HISTORY  Family History   Problem Relation Age of Onset   • Heart attack Father    • Hypertension Father    • Heart disease Father          SOCIAL HISTORY  Social History     Occupational  "History   • Not on file   Tobacco Use   • Smoking status: Never Smoker   • Smokeless tobacco: Never Used   • Tobacco comment: caffiene occ   Substance and Sexual Activity   • Alcohol use: Yes     Alcohol/week: 1.8 oz     Types: 3 Cans of beer per week     Comment: occ   • Drug use: No   • Sexual activity: Defer       Debilities/Disabilities Identified: None    Emotional Behavior: Appropriate    CURRENT MEDICATIONS    Current Outpatient Medications:   •  furosemide (LASIX) 20 MG tablet, Take 1 tablet by mouth Daily., Disp: 30 tablet, Rfl: 11  •  metoprolol succinate XL (TOPROL-XL) 100 MG 24 hr tablet, Take 1 tablet by mouth Daily., Disp: 90 tablet, Rfl: 3  •  pantoprazole (PROTONIX) 40 MG EC tablet, Take 40 mg by mouth 2 (Two) Times a Day., Disp: , Rfl:   •  pantoprazole (PROTONIX) 40 MG EC tablet, Take 1 tablet by mouth Daily., Disp: 90 tablet, Rfl: 3  •  sotalol (BETAPACE) 160 MG tablet, TAKE 1 TABLET BY MOUTH TWICE DAILY, Disp: 90 tablet, Rfl: 1    ALLERGIES  Patient has no known allergies.    VITALS  Vitals:    09/30/19 1511   Weight: 117 kg (257 lb)   Height: 185.4 cm (72.99\")       LAST RESULTS   Admission on 08/13/2019, Discharged on 08/13/2019   Component Date Value Ref Range Status   • Case Report 08/13/2019    Final                    Value:Surgical Pathology Report                         Case: [id number]                                  Authorizing Provider:  Duc Medley        Collected:           08/13/2019 02:27 PM                                 MD Willow                                                                   Ordering Location:     [facility]  Received:            08/13/2019 10:50 PM                                 ENDO SUITES                                                                  Pathologist:           Armando Mon MD                                                         Specimens:   1) - Gastric, Antrum, GASTRIC ULCER BIOSPIES                       "                                  2) - Esophagus, Distal, DISTAL ESOPHAGUS BIOPSIES                                                   3) - Large Intestine, Cecum, ILEO-CECAL VALVE POLYP                                                 4) - Large Intestine, Right / Ascending Colon, HEPATIC FLEXURE POLYP                                                          5) - Large Intestine, Left / Descending Colon, DESCENDING COLON POLYP                               6) - Large Intestine, Transverse Colon, TRANSVERSE COLON POLYP                            • Final Diagnosis 08/13/2019    Final                    Value:This result contains rich text formatting which cannot be displayed here.   • Gross Description 08/13/2019    Final                    Value:This result contains rich text formatting which cannot be displayed here.   • Microscopic Description 08/13/2019    Final                    Value:This result contains rich text formatting which cannot be displayed here.     No results found.    PHYSICAL EXAM  Physical Exam   Constitutional: He is oriented to person, place, and time. He appears well-developed and well-nourished.   HENT:   Head: Normocephalic and atraumatic.   Eyes: Conjunctivae and EOM are normal. Pupils are equal, round, and reactive to light. No scleral icterus.   Neck: Normal range of motion. Neck supple. No thyromegaly present.   Cardiovascular: Normal rate, regular rhythm, normal heart sounds and intact distal pulses. Exam reveals no gallop.   No murmur heard.  Pulmonary/Chest: Effort normal and breath sounds normal. He has no wheezes. He has no rales.   Abdominal: Soft. Bowel sounds are normal. He exhibits no shifting dullness, no distension, no fluid wave, no abdominal bruit, no ascites and no mass. There is no hepatosplenomegaly. There is no tenderness. There is no guarding and negative Bravo's sign. Hernia confirmed negative in the ventral area.   Musculoskeletal: Normal range of motion. He exhibits  no edema.   Lymphadenopathy:     He has no cervical adenopathy.   Neurological: He is alert and oriented to person, place, and time.   Skin: Skin is warm and dry. No rash noted. He is not diaphoretic. No erythema.   Psychiatric: He has a normal mood and affect. His behavior is normal.       ASSESSMENT  Diagnoses and all orders for this visit:    Acute post-hemorrhagic anemia  -     CBC & Differential    Acute gastric ulcer without hemorrhage or perforation    Paroxysmal atrial fibrillation (CMS/HCC)          PLAN  Check his CBC and presumably restart he Warfarin    Return in about 2 months (around 11/30/2019).

## 2019-11-08 DIAGNOSIS — I48.0 PAROXYSMAL ATRIAL FIBRILLATION (HCC): ICD-10-CM

## 2019-11-11 RX ORDER — METOPROLOL SUCCINATE 100 MG/1
TABLET, EXTENDED RELEASE ORAL
Qty: 90 TABLET | Refills: 3 | Status: SHIPPED | OUTPATIENT
Start: 2019-11-11 | End: 2020-10-06

## 2019-11-18 ENCOUNTER — OFFICE VISIT (OUTPATIENT)
Dept: GASTROENTEROLOGY | Facility: CLINIC | Age: 69
End: 2019-11-18

## 2019-11-18 ENCOUNTER — APPOINTMENT (OUTPATIENT)
Dept: LAB | Facility: HOSPITAL | Age: 69
End: 2019-11-18

## 2019-11-18 VITALS — HEIGHT: 73 IN | BODY MASS INDEX: 34.99 KG/M2 | WEIGHT: 264 LBS

## 2019-11-18 DIAGNOSIS — D50.0 IRON DEFICIENCY ANEMIA DUE TO CHRONIC BLOOD LOSS: ICD-10-CM

## 2019-11-18 DIAGNOSIS — D62 ACUTE POST-HEMORRHAGIC ANEMIA: Primary | ICD-10-CM

## 2019-11-18 DIAGNOSIS — Z86.010 PERSONAL HISTORY OF COLONIC POLYPS: ICD-10-CM

## 2019-11-18 LAB
BASOPHILS # BLD AUTO: 0.05 10*3/MM3 (ref 0–0.2)
BASOPHILS NFR BLD AUTO: 0.4 % (ref 0–1.5)
DEPRECATED RDW RBC AUTO: 56.5 FL (ref 37–54)
EOSINOPHIL # BLD AUTO: 0.32 10*3/MM3 (ref 0–0.4)
EOSINOPHIL NFR BLD AUTO: 2.8 % (ref 0.3–6.2)
ERYTHROCYTE [DISTWIDTH] IN BLOOD BY AUTOMATED COUNT: 18.5 % (ref 12.3–15.4)
HCT VFR BLD AUTO: 48.5 % (ref 37.5–51)
HGB BLD-MCNC: 15.1 G/DL (ref 13–17.7)
IMM GRANULOCYTES # BLD AUTO: 0.04 10*3/MM3 (ref 0–0.05)
IMM GRANULOCYTES NFR BLD AUTO: 0.4 % (ref 0–0.5)
LYMPHOCYTES # BLD AUTO: 2.36 10*3/MM3 (ref 0.7–3.1)
LYMPHOCYTES NFR BLD AUTO: 20.7 % (ref 19.6–45.3)
MCH RBC QN AUTO: 26 PG (ref 26.6–33)
MCHC RBC AUTO-ENTMCNC: 31.1 G/DL (ref 31.5–35.7)
MCV RBC AUTO: 83.5 FL (ref 79–97)
MONOCYTES # BLD AUTO: 1.31 10*3/MM3 (ref 0.1–0.9)
MONOCYTES NFR BLD AUTO: 11.5 % (ref 5–12)
NEUTROPHILS # BLD AUTO: 7.33 10*3/MM3 (ref 1.7–7)
NEUTROPHILS NFR BLD AUTO: 64.2 % (ref 42.7–76)
NRBC BLD AUTO-RTO: 0 /100 WBC (ref 0–0.2)
PLATELET # BLD AUTO: 208 10*3/MM3 (ref 140–450)
PMV BLD AUTO: 11.3 FL (ref 6–12)
RBC # BLD AUTO: 5.81 10*6/MM3 (ref 4.14–5.8)
WBC NRBC COR # BLD: 11.41 10*3/MM3 (ref 3.4–10.8)

## 2019-11-18 PROCEDURE — 36415 COLL VENOUS BLD VENIPUNCTURE: CPT | Performed by: INTERNAL MEDICINE

## 2019-11-18 PROCEDURE — 85025 COMPLETE CBC W/AUTO DIFF WBC: CPT | Performed by: INTERNAL MEDICINE

## 2019-11-18 PROCEDURE — 99213 OFFICE O/P EST LOW 20 MIN: CPT | Performed by: INTERNAL MEDICINE

## 2019-11-18 NOTE — PROGRESS NOTES
PATIENT INFORMATION  Hakeem Onofre Jr.       - 1950    CHIEF COMPLAINT  Chief Complaint   Patient presents with   • Follow-up     2 mo follow up on anemia       HISTORY OF PRESENT ILLNESS  Here fopr RODDY from a bleed while on AC and is back on AC w/o any suymptoms    Is on OTC FESO4 and doing well no compalints            REVIEW OF SYSTEMS  Review of Systems   All other systems reviewed and are negative.        ACTIVE PROBLEMS  Patient Active Problem List    Diagnosis   • Gastric ulcer without hemorrhage or perforation [K25.9]   • Acute post-hemorrhagic anemia [D62]   • Personal history of colonic polyps [Z86.010]   • Morbidly obese (CMS/HCC) [E66.01]   • History of GI bleed [Z87.19]   • Atrial fibrillation (CMS/HCC) [I48.91]   • Hypertension [I10]         PAST MEDICAL HISTORY  Past Medical History:   Diagnosis Date   • Arrhythmia    • Atrial fibrillation (CMS/HCC)    • Colon polyp    • Gastric ulcer    • Gastric ulcer    • GERD (gastroesophageal reflux disease)    • History of transfusion 2019    gastric ulcer   • Hypertension    • Sleep apnea     uses cpap         SURGICAL HISTORY  Past Surgical History:   Procedure Laterality Date   • ABLATION OF DYSRHYTHMIC FOCUS      for treatment of Afib/flutter   • CARDIAC CATHETERIZATION     • CATARACT EXTRACTION     • COLONOSCOPY N/A 2019    Procedure: COLONOSCOPY TO CECUM AND TI WITH COLD SNARE POLYPECTOMIES;  Surgeon: Duc Medley MD;  Location: [facility] ENDOSCOPY;  Service: Gastroenterology   • ENDOSCOPY     • ENDOSCOPY N/A 2019    Procedure: ESOPHAGOGASTRODUODENOSCOPY WITH COLD BIOPSIES;  Surgeon: Duc Medley MD;  Location: [facility] ENDOSCOPY;  Service: Gastroenterology         FAMILY HISTORY  Family History   Problem Relation Age of Onset   • Heart attack Father    • Hypertension Father    • Heart disease Father          SOCIAL HISTORY  Social History     Occupational History   • Not on file   Tobacco Use   •  "Smoking status: Never Smoker   • Smokeless tobacco: Never Used   • Tobacco comment: aldo occ   Substance and Sexual Activity   • Alcohol use: Yes     Alcohol/week: 1.8 oz     Types: 3 Cans of beer per week     Comment: occ   • Drug use: No   • Sexual activity: Defer       Debilities/Disabilities Identified: None    Emotional Behavior: Appropriate    CURRENT MEDICATIONS    Current Outpatient Medications:   •  apixaban (ELIQUIS) 5 MG tablet tablet, Take 1 tablet by mouth Every 12 (Twelve) Hours., Disp: 180 tablet, Rfl: 0  •  metoprolol succinate XL (TOPROL-XL) 100 MG 24 hr tablet, TAKE 1 TABLET BY MOUTH ONCE DAILY, Disp: 90 tablet, Rfl: 3  •  pantoprazole (PROTONIX) 40 MG EC tablet, Take 1 tablet by mouth Daily., Disp: 90 tablet, Rfl: 3  •  sotalol (BETAPACE) 160 MG tablet, TAKE 1 TABLET BY MOUTH TWICE DAILY, Disp: 90 tablet, Rfl: 1    ALLERGIES  Patient has no known allergies.    VITALS  Vitals:    11/18/19 1559   Weight: 120 kg (264 lb)   Height: 185.4 cm (72.99\")       LAST RESULTS   Office Visit on 09/30/2019   Component Date Value Ref Range Status   • WBC 09/30/2019 9.96  3.40 - 10.80 10*3/mm3 Final   • RBC 09/30/2019 5.26  4.14 - 5.80 10*6/mm3 Final   • Hemoglobin 09/30/2019 12.2* 13.0 - 17.7 g/dL Final   • Hematocrit 09/30/2019 41.0  37.5 - 51.0 % Final   • MCV 09/30/2019 77.9* 79.0 - 97.0 fL Final   • MCH 09/30/2019 23.2* 26.6 - 33.0 pg Final   • MCHC 09/30/2019 29.8* 31.5 - 35.7 g/dL Final   • RDW 09/30/2019 22.5* 12.3 - 15.4 % Final   • RDW-SD 09/30/2019 61.5* 37.0 - 54.0 fl Final   • MPV 09/30/2019 12.1* 6.0 - 12.0 fL Final   • Platelets 09/30/2019 262  140 - 450 10*3/mm3 Final   • Neutrophil % 09/30/2019 70.1  42.7 - 76.0 % Final   • Lymphocyte % 09/30/2019 23.7  19.6 - 45.3 % Final   • Monocyte % 09/30/2019 5.2  5.0 - 12.0 % Final   • Basophil % 09/30/2019 1.0  0.0 - 1.5 % Final   • Neutrophils Absolute 09/30/2019 6.98  1.70 - 7.00 10*3/mm3 Final   • Lymphocytes Absolute 09/30/2019 2.36  0.70 - 3.10 " 10*3/mm3 Final   • Monocytes Absolute 09/30/2019 0.52  0.10 - 0.90 10*3/mm3 Final   • Basophils Absolute 09/30/2019 0.10  0.00 - 0.20 10*3/mm3 Final   • Anisocytosis 09/30/2019 Mod/2+  None Seen Final   • WBC Morphology 09/30/2019 Normal  Normal Final   • Platelet Morphology 09/30/2019 Normal  Normal Final     No results found.    PHYSICAL EXAM  Physical Exam   Constitutional: He is oriented to person, place, and time. He appears well-developed and well-nourished.   HENT:   Head: Normocephalic and atraumatic.   Eyes: Conjunctivae and EOM are normal. Pupils are equal, round, and reactive to light. No scleral icterus.   Neck: Normal range of motion. Neck supple. No thyromegaly present.   Cardiovascular: Normal rate, regular rhythm, normal heart sounds and intact distal pulses. Exam reveals no gallop.   No murmur heard.  Pulmonary/Chest: Effort normal and breath sounds normal. He has no wheezes. He has no rales.   Abdominal: Soft. Bowel sounds are normal. He exhibits no shifting dullness, no distension, no fluid wave, no abdominal bruit, no ascites and no mass. There is no hepatosplenomegaly. There is no tenderness. There is no guarding and negative Bravo's sign. Hernia confirmed negative in the ventral area.   Musculoskeletal: Normal range of motion. He exhibits no edema.   Lymphadenopathy:     He has no cervical adenopathy.   Neurological: He is alert and oriented to person, place, and time.   Skin: Skin is warm and dry. No rash noted. He is not diaphoretic. No erythema.   Psychiatric: He has a normal mood and affect. His behavior is normal.       ASSESSMENT  Diagnoses and all orders for this visit:    Acute post-hemorrhagic anemia    Iron deficiency anemia due to chronic blood loss  -     CBC & Differential    Personal history of colonic polyps          PLAN  Will recheck his CBC and paln on Ferritin in 4 months    Return in about 4 months (around 3/18/2020).

## 2019-12-07 DIAGNOSIS — I48.0 PAROXYSMAL ATRIAL FIBRILLATION (HCC): ICD-10-CM

## 2019-12-09 RX ORDER — SOTALOL HYDROCHLORIDE 160 MG/1
TABLET ORAL
Qty: 180 TABLET | Refills: 1 | Status: SHIPPED | OUTPATIENT
Start: 2019-12-09 | End: 2020-05-31

## 2020-05-31 DIAGNOSIS — I48.0 PAROXYSMAL ATRIAL FIBRILLATION (HCC): ICD-10-CM

## 2020-05-31 RX ORDER — SOTALOL HYDROCHLORIDE 160 MG/1
TABLET ORAL
Qty: 180 TABLET | Refills: 0 | Status: SHIPPED | OUTPATIENT
Start: 2020-05-31 | End: 2020-08-27 | Stop reason: SDUPTHER

## 2020-08-17 RX ORDER — PANTOPRAZOLE SODIUM 40 MG/1
TABLET, DELAYED RELEASE ORAL
Qty: 90 TABLET | Refills: 3 | Status: SHIPPED | OUTPATIENT
Start: 2020-08-17 | End: 2021-05-24

## 2020-08-27 ENCOUNTER — OFFICE VISIT (OUTPATIENT)
Dept: CARDIOLOGY | Facility: CLINIC | Age: 70
End: 2020-08-27

## 2020-08-27 VITALS — HEIGHT: 73 IN | BODY MASS INDEX: 35.12 KG/M2 | WEIGHT: 265 LBS

## 2020-08-27 DIAGNOSIS — I48.0 PAROXYSMAL ATRIAL FIBRILLATION (HCC): Primary | Chronic | ICD-10-CM

## 2020-08-27 DIAGNOSIS — I10 ESSENTIAL HYPERTENSION: Chronic | ICD-10-CM

## 2020-08-27 PROCEDURE — 99441 PR PHYS/QHP TELEPHONE EVALUATION 5-10 MIN: CPT | Performed by: INTERNAL MEDICINE

## 2020-08-27 RX ORDER — SOTALOL HYDROCHLORIDE 160 MG/1
160 TABLET ORAL 2 TIMES DAILY
Qty: 180 TABLET | Refills: 3 | Status: SHIPPED | OUTPATIENT
Start: 2020-08-27 | End: 2021-10-04

## 2020-08-27 NOTE — PROGRESS NOTES
Subjective:     Encounter Date: 08/27/20        Patient ID: Hakeem Onofre Jr. is a 69 y.o. male.    Chief Complaint: PAF  History of Present Illness      This patient has consented to a telehealth visit via audio. The visit was scheduled as a audio visit to comply with patient safety concerns in accordance with CDC recommendations.  All vitals recorded within this visit are reported by the patient.  I spent  15 minutes in total including but not limited to the 9 minutes spent in direct conversation with this patient.     Patient has a history of paroxysmal atrial fibrillation. He had a cardioversion in 2017.    He ahs been doing great without any issues. He denies any chest pain, pressure, tightness, squeezing, or heartburn.  He has not experienced any feeling of palpitations, tachycardia or heart racing and no presyncope or syncope.  There has not been any problems with dizziness or lightheadedness.  There has not been any orthopnea or PND, and no problems with lower extremity edema.  He denies any shortness of breath at rest or with activity and has not had any wheezing.  He has continued to perform daily activities of living without any specific problem or change in the level of activity.    He had a hospitalization at [facility] in 2018.    Seen in our office in June to follow-up from that hospitalization as well.  He states he and his wife were in [facility] to meet their new grandchild.  The day they were to leave, the patient had 3 episodes of nausea, vomiting and diarrhea.  By the third episode he saw blood in his vomitus and in his stool.  His son called EMS and he was taken to the hospital.  He was admitted to the ICU with coffee-ground emesis, dark stools and found to have supratherapeutic INR on Xarelto, bradycardic (HR 49), hypotension (70/44).  He underwent an EGD that showed acute gastritis and non-bleeding 1.2 cm gastric ulcer that was clipped.  He received a total of 4  units of packed red blood cells as well as 1 unit of FFP.  His course was complicated by atrial flutter with RVR and he was started back on metoprolol and sotalol.  He was then restarted on a heparin drip for anticoagulation after GI clearance but patient unfortunately had a bloody bowel movement the same day after which time the heparin was stopped.  He had not had a previous GI bleeding history and states he was started on Xarelto about 5 years ago without any bleeding complications.  He had had a colonoscopy perhaps 10 years ago which showed polyps that were resected but never had an EGD before this admission.     H. pylori serology was positive, stool Ag negative.  He was instructed to have a repeat EGD in 6 to 8 weeks to assess the healing of his gastric ulcer.  He was started on Protonix 40 mg twice daily.  His hemoglobin day of discharge was 7.3.      Patient also has a history of atrial flutter that was diagnosed in 2012 and at that point he underwent atrial flutter ablation.    The following portions of the patient's history were reviewed and updated as appropriate: allergies, current medications, past family history, past medical history, past social history, past surgical history and problem list.    Past Medical History:   Diagnosis Date   • Arrhythmia    • Atrial fibrillation (CMS/HCC)    • Colon polyp    • Gastric ulcer    • Gastric ulcer    • GERD (gastroesophageal reflux disease)    • History of transfusion 2019    gastric ulcer   • Hypertension    • Sleep apnea     uses cpap       Past Surgical History:   Procedure Laterality Date   • ABLATION OF DYSRHYTHMIC FOCUS  2012    for treatment of Afib/flutter   • CARDIAC CATHETERIZATION     • CATARACT EXTRACTION     • COLONOSCOPY N/A 8/13/2019    Procedure: COLONOSCOPY TO CECUM AND TI WITH COLD SNARE POLYPECTOMIES;  Surgeon: Duc Medley MD;  Location: [facility] ENDOSCOPY;  Service: Gastroenterology   • ENDOSCOPY     • ENDOSCOPY N/A 8/13/2019     Procedure: ESOPHAGOGASTRODUODENOSCOPY WITH COLD BIOPSIES;  Surgeon: Duc Medley MD;  Location: [facility] ENDOSCOPY;  Service: Gastroenterology       Social History     Socioeconomic History   • Marital status:      Spouse name: Not on file   • Number of children: Not on file   • Years of education: Not on file   • Highest education level: Not on file   Tobacco Use   • Smoking status: Never Smoker   • Smokeless tobacco: Never Used   • Tobacco comment: caffiene occ   Substance and Sexual Activity   • Alcohol use: Yes     Alcohol/week: 3.0 standard drinks     Types: 3 Cans of beer per week     Comment: occ   • Drug use: No   • Sexual activity: Defer       Review of Systems   Constitution: Negative for chills, decreased appetite, fever and night sweats.   HENT: Negative for ear discharge, ear pain, hearing loss, nosebleeds and sore throat.    Eyes: Negative for blurred vision, double vision and pain.   Cardiovascular: Negative for cyanosis.   Respiratory: Negative for hemoptysis and sputum production.    Endocrine: Negative for cold intolerance and heat intolerance.   Hematologic/Lymphatic: Negative for adenopathy.   Skin: Negative for dry skin, itching, nail changes, rash and suspicious lesions.   Musculoskeletal: Negative for arthritis, gout, muscle cramps, muscle weakness, myalgias and neck pain.   Gastrointestinal: Negative for anorexia, bowel incontinence, constipation, diarrhea, dysphagia, hematemesis and jaundice.   Genitourinary: Negative for bladder incontinence, dysuria, flank pain, frequency, hematuria and nocturia.   Neurological: Negative for focal weakness, numbness, paresthesias and seizures.   Psychiatric/Behavioral: Negative for altered mental status, hallucinations, hypervigilance, suicidal ideas and thoughts of violence.   Allergic/Immunologic: Negative for persistent infections.       Procedures       Objective:     Vitals:    08/27/20 1329   Weight: 120 kg (265 lb)   Height:  "185.2 cm (72.9\")        Alert and oriented x3, thought processes normal, judgment normal, speaking in full sentences with no wheezing and no respiratory distress. Hearing is appropriate without difficulty.  Lab Review:           performed      Assessment:          Diagnosis Plan   1. Paroxysmal atrial fibrillation (CMS/HCC)     2. Essential hypertension            Plan:       1. Atrial Fibrillation and Atrial Flutter  Assessment  • The patient has paroxysmal atrial fibrillation  • This is non-valvular in etiology  • The patient's CHADS2-VASc score is 2  • A [id number] score of 2 or more is considered a high risk for a thromboembolic event  • Rivaroxaban not prescribed for medical reasons    Plan  • Attempt to maintain sinus rhythm  • Continue sotalol for rhythm control    2.  GI bleed- scheduled to follow-up with Dr. Medley.    3.  Anticoagulation-  On Eliquis, Xarelto now would be cheaper, we will switch him back to the Xarelto    Thank you very much for allowing us to participate in the care of this pleasant patient.  Please don't hesitate to call if I can be of assistance in any way.      Current Outpatient Medications:   •  apixaban (ELIQUIS) 5 MG tablet tablet, Take 1 tablet by mouth Every 12 (Twelve) Hours., Disp: 180 tablet, Rfl: 0  •  Ferrous Sulfate (IRON PO), Take 1 tablet by mouth Daily., Disp: , Rfl:   •  metoprolol succinate XL (TOPROL-XL) 100 MG 24 hr tablet, TAKE 1 TABLET BY MOUTH ONCE DAILY, Disp: 90 tablet, Rfl: 3  •  pantoprazole (PROTONIX) 40 MG EC tablet, Take 1 tablet by mouth once daily, Disp: 90 tablet, Rfl: 3  •  sotalol (BETAPACE) 160 MG tablet, Take 1 tablet by mouth twice daily, Disp: 180 tablet, Rfl: 0       "

## 2020-10-04 DIAGNOSIS — I48.0 PAROXYSMAL ATRIAL FIBRILLATION (HCC): ICD-10-CM

## 2020-10-06 RX ORDER — METOPROLOL SUCCINATE 100 MG/1
TABLET, EXTENDED RELEASE ORAL
Qty: 90 TABLET | Refills: 0 | Status: SHIPPED | OUTPATIENT
Start: 2020-10-06 | End: 2021-01-05

## 2021-01-02 DIAGNOSIS — I48.0 PAROXYSMAL ATRIAL FIBRILLATION (HCC): ICD-10-CM

## 2021-01-05 RX ORDER — METOPROLOL SUCCINATE 100 MG/1
TABLET, EXTENDED RELEASE ORAL
Qty: 90 TABLET | Refills: 2 | Status: SHIPPED | OUTPATIENT
Start: 2021-01-05 | End: 2021-10-04

## 2021-05-24 RX ORDER — PANTOPRAZOLE SODIUM 40 MG/1
TABLET, DELAYED RELEASE ORAL
Qty: 90 TABLET | Refills: 3 | Status: SHIPPED | OUTPATIENT
Start: 2021-05-24 | End: 2021-10-04

## 2021-10-04 DIAGNOSIS — I10 ESSENTIAL HYPERTENSION: Chronic | ICD-10-CM

## 2021-10-04 DIAGNOSIS — I48.0 PAROXYSMAL ATRIAL FIBRILLATION (HCC): Chronic | ICD-10-CM

## 2021-10-04 DIAGNOSIS — K21.9 GASTROESOPHAGEAL REFLUX DISEASE, UNSPECIFIED WHETHER ESOPHAGITIS PRESENT: Primary | ICD-10-CM

## 2021-10-04 RX ORDER — PANTOPRAZOLE SODIUM 40 MG/1
TABLET, DELAYED RELEASE ORAL
Qty: 90 TABLET | Refills: 0 | Status: SHIPPED | OUTPATIENT
Start: 2021-10-04 | End: 2021-10-07

## 2021-10-04 RX ORDER — SOTALOL HYDROCHLORIDE 160 MG/1
TABLET ORAL
Qty: 180 TABLET | Refills: 0 | Status: SHIPPED | OUTPATIENT
Start: 2021-10-04 | End: 2021-12-29

## 2021-10-04 RX ORDER — METOPROLOL SUCCINATE 100 MG/1
TABLET, EXTENDED RELEASE ORAL
Qty: 90 TABLET | Refills: 0 | Status: SHIPPED | OUTPATIENT
Start: 2021-10-04 | End: 2021-12-16 | Stop reason: HOSPADM

## 2021-10-07 ENCOUNTER — OFFICE VISIT (OUTPATIENT)
Dept: CARDIOLOGY | Facility: CLINIC | Age: 71
End: 2021-10-07

## 2021-10-07 VITALS
HEART RATE: 102 BPM | SYSTOLIC BLOOD PRESSURE: 138 MMHG | BODY MASS INDEX: 36.18 KG/M2 | HEIGHT: 73 IN | WEIGHT: 273 LBS | DIASTOLIC BLOOD PRESSURE: 86 MMHG

## 2021-10-07 DIAGNOSIS — I10 ESSENTIAL HYPERTENSION: Chronic | ICD-10-CM

## 2021-10-07 DIAGNOSIS — I10 PRIMARY HYPERTENSION: Chronic | ICD-10-CM

## 2021-10-07 DIAGNOSIS — I48.0 PAROXYSMAL ATRIAL FIBRILLATION (HCC): Primary | Chronic | ICD-10-CM

## 2021-10-07 DIAGNOSIS — K21.9 GASTROESOPHAGEAL REFLUX DISEASE, UNSPECIFIED WHETHER ESOPHAGITIS PRESENT: ICD-10-CM

## 2021-10-07 DIAGNOSIS — E66.01 MORBIDLY OBESE (HCC): ICD-10-CM

## 2021-10-07 PROCEDURE — 99214 OFFICE O/P EST MOD 30 MIN: CPT | Performed by: INTERNAL MEDICINE

## 2021-10-07 PROCEDURE — 93000 ELECTROCARDIOGRAM COMPLETE: CPT | Performed by: INTERNAL MEDICINE

## 2021-10-07 RX ORDER — PANTOPRAZOLE SODIUM 40 MG/1
TABLET, DELAYED RELEASE ORAL
Qty: 90 TABLET | Refills: 0 | Status: SHIPPED | OUTPATIENT
Start: 2021-10-07 | End: 2022-04-05

## 2021-10-07 NOTE — PROGRESS NOTES
Subjective:     Encounter Date: 10/07/21        Patient ID: Hakeem Onofre Jr. is a 71 y.o. male.    Chief Complaint: PAF  History of Present Illness      Patient has a history of paroxysmal atrial fibrillation. He had a cardioversion in 2017.    He comes in today for 1 year follow-up.  He says over the last month or so he has not felt his pulse is regular and he is concerned that he is back in atrial fibrillation.  He has not had any other symptoms.  Denies any chest pain or chest discomfort.  No shortness of breath.  No dizziness lightheadedness no presyncope or syncope.  He has not had any orthopnea or PND.  He has been compliant with his medical therapy.    He had a hospitalization at [facility] in 2018.    Seen in our office in June to follow-up from that hospitalization as well.  He states he and his wife were in [facility] to meet their new grandchild.  The day they were to leave, the patient had 3 episodes of nausea, vomiting and diarrhea.  By the third episode he saw blood in his vomitus and in his stool.  His son called EMS and he was taken to the hospital.  He was admitted to the ICU with coffee-ground emesis, dark stools and found to have supratherapeutic INR on Xarelto, bradycardic (HR 49), hypotension (70/44).  He underwent an EGD that showed acute gastritis and non-bleeding 1.2 cm gastric ulcer that was clipped.  He received a total of 4 units of packed red blood cells as well as 1 unit of FFP.  His course was complicated by atrial flutter with RVR and he was started back on metoprolol and sotalol.  He was then restarted on a heparin drip for anticoagulation after GI clearance but patient unfortunately had a bloody bowel movement the same day after which time the heparin was stopped.  He had not had a previous GI bleeding history and states he was started on Xarelto about 5 years ago without any bleeding complications.  He had had a colonoscopy perhaps 10 years ago which  showed polyps that were resected but never had an EGD before this admission.     H. pylori serology was positive, stool Ag negative.  He was instructed to have a repeat EGD in 6 to 8 weeks to assess the healing of his gastric ulcer.  He was started on Protonix 40 mg twice daily.  His hemoglobin day of discharge was 7.3.      Patient also has a history of atrial flutter that was diagnosed in 2012 and at that point he underwent atrial flutter ablation.    The following portions of the patient's history were reviewed and updated as appropriate: allergies, current medications, past family history, past medical history, past social history, past surgical history and problem list.    Past Medical History:   Diagnosis Date   • Arrhythmia    • Atrial fibrillation (HCC)    • Colon polyp    • Gastric ulcer    • Gastric ulcer    • GERD (gastroesophageal reflux disease)    • History of transfusion 2019    gastric ulcer   • Hypertension    • Sleep apnea     uses cpap       Past Surgical History:   Procedure Laterality Date   • ABLATION OF DYSRHYTHMIC FOCUS  2012    for treatment of Afib/flutter   • CARDIAC CATHETERIZATION     • CATARACT EXTRACTION     • COLONOSCOPY N/A 8/13/2019    Procedure: COLONOSCOPY TO CECUM AND TI WITH COLD SNARE POLYPECTOMIES;  Surgeon: Duc Medley MD;  Location: [facility] ENDOSCOPY;  Service: Gastroenterology   • ENDOSCOPY     • ENDOSCOPY N/A 8/13/2019    Procedure: ESOPHAGOGASTRODUODENOSCOPY WITH COLD BIOPSIES;  Surgeon: Duc Medley MD;  Location: [facility] ENDOSCOPY;  Service: Gastroenterology           ECG 12 Lead    Date/Time: 10/7/2021 3:59 PM  Performed by: Donnie Berger III, MD  Authorized by: Donnie Berger III, MD   Comparison: compared with previous ECG   Comparison to previous ECG: Atrial fibrillation is new  Rhythm: atrial fibrillation  Rate: normal  Conduction: conduction normal  ST Segments: ST segments normal  T Waves: T waves normal  QRS axis: normal  Other: no  "other findings    Clinical impression: abnormal EKG               Objective:     Vitals:    10/07/21 1400   BP: 138/86   Pulse: 102   Weight: 124 kg (273 lb)   Height: 185.2 cm (72.9\")           General Appearance:    Alert, cooperative, in no acute distress   Head:    Normocephalic, without obvious abnormality, atraumatic   Eyes:            Lids and lashes normal, conjunctivae and sclerae normal, no   icterus, no pallor, corneas clear, PERRLA   Ears:    Ears appear intact with no abnormalities noted   Throat:   No oral lesions, no thrush, oral mucosa moist   Neck:   No adenopathy, supple, trachea midline, no thyromegaly, no   carotid bruit, no JVD   Back:     No kyphosis present, no scoliosis present, no skin lesions, erythema or scars, no tenderness to percussion or palpation, range of motion normal   Lungs:     Clear to auscultation,respirations regular, even and unlabored    Heart:    irregular rhythm and normal rate, normal S1 and S2, no murmur, no gallop, no rub, no click   Chest Wall:    No abnormalities observed   Abdomen:     Normal bowel sounds, no masses, no organomegaly, soft        non-tender, non-distended, no guarding, no rebound  tenderness   Extremities:   Moves all extremities well, no edema, no cyanosis, no redness   Pulses:   Pulses palpable and equal bilaterally. Normal radial, carotid, femoral, dorsalis pedis and posterior tibial pulses bilaterally. Normal abdominal aorta   Skin:  Psychiatric:   No bleeding, bruising or rash    Alert and oriented x 3, normal mood and affect       Lab Review:           Lab Results   Component Value Date    GLUCOSE 115 (H) 06/24/2019    BUN 13 06/24/2019    CREATININE 0.96 06/24/2019    EGFRIFNONA 78 06/24/2019    BCR 13.5 06/24/2019    K 4.1 06/24/2019    CO2 23.2 06/24/2019    CALCIUM 8.8 06/24/2019    ALBUMIN 4.10 06/14/2017    AST 26 06/14/2017    ALT 21 06/14/2017           Assessment:          Diagnosis Plan   1. Paroxysmal atrial fibrillation (HCC)  Adult " Transthoracic Echo Complete W/ Cont if Necessary Per Protocol    Holter Monitor - 24 Hour   2. Primary hypertension  Adult Transthoracic Echo Complete W/ Cont if Necessary Per Protocol    Holter Monitor - 24 Hour   3. Morbidly obese (HCC)  Adult Transthoracic Echo Complete W/ Cont if Necessary Per Protocol    Holter Monitor - 24 Hour   4. Essential hypertension  Adult Transthoracic Echo Complete W/ Cont if Necessary Per Protocol    Holter Monitor - 24 Hour          Plan:       1. Atrial Fibrillation and Atrial Flutter  Assessment  • The patient has paroxysmal atrial fibrillation  • This is non-valvular in etiology  • The patient's CHADS2-VASc score is 2  • A [id number] score of 2 or more is considered a high risk for a thromboembolic event    Plan  • Attempt to maintain sinus rhythm  • Continue rivaroxaban for antithrombotic therapy, bleeding issues discussed  • Continue sotalol for rhythm control  • Patient is back in atrial fibrillation, he is not sure along but may be a month from his estimation.  Place a Holter monitor as well as obtain an echocardiogram.    2.  Chronic anticoagulation, hold history of GI bleed but currently doing well with no bleeding.    Thank you very much for allowing us to participate in the care of this pleasant patient.  Please don't hesitate to call if I can be of assistance in any way.      Current Outpatient Medications:   •  Ferrous Sulfate (IRON PO), Take 1 tablet by mouth Daily., Disp: , Rfl:   •  metoprolol succinate XL (TOPROL-XL) 100 MG 24 hr tablet, Take 1 tablet by mouth once daily, Disp: 90 tablet, Rfl: 0  •  rivaroxaban (Xarelto) 20 MG tablet, Take 1 tablet by mouth Daily., Disp: 90 tablet, Rfl: 3  •  sotalol (BETAPACE) 160 MG tablet, Take 1 tablet by mouth twice daily, Disp: 180 tablet, Rfl: 0  •  pantoprazole (PROTONIX) 40 MG EC tablet, Take 1 tablet by mouth once daily, Disp: 90 tablet, Rfl: 0

## 2021-10-12 ENCOUNTER — APPOINTMENT (OUTPATIENT)
Dept: CARDIOLOGY | Facility: HOSPITAL | Age: 71
End: 2021-10-12

## 2021-10-26 ENCOUNTER — HOSPITAL ENCOUNTER (OUTPATIENT)
Dept: CARDIOLOGY | Facility: HOSPITAL | Age: 71
Discharge: HOME | End: 2021-10-26

## 2021-10-26 ENCOUNTER — TELEPHONE (OUTPATIENT)
Dept: CARDIOLOGY | Facility: CLINIC | Age: 71
End: 2021-10-26

## 2021-10-26 VITALS
HEIGHT: 73 IN | SYSTOLIC BLOOD PRESSURE: 154 MMHG | DIASTOLIC BLOOD PRESSURE: 95 MMHG | WEIGHT: 273.37 LBS | BODY MASS INDEX: 36.23 KG/M2 | HEART RATE: 97 BPM

## 2021-10-26 DIAGNOSIS — I48.0 PAROXYSMAL ATRIAL FIBRILLATION (HCC): ICD-10-CM

## 2021-10-26 DIAGNOSIS — I10 ESSENTIAL HYPERTENSION: ICD-10-CM

## 2021-10-26 DIAGNOSIS — E66.01 MORBIDLY OBESE (HCC): ICD-10-CM

## 2021-10-26 DIAGNOSIS — I10 PRIMARY HYPERTENSION: ICD-10-CM

## 2021-10-26 LAB
BH CV ECHO MEAS - AO MAX PG (FULL): 0.48 MMHG
BH CV ECHO MEAS - AO MAX PG: 3.7 MMHG
BH CV ECHO MEAS - AO MEAN PG (FULL): 0 MMHG
BH CV ECHO MEAS - AO MEAN PG: 2 MMHG
BH CV ECHO MEAS - AO ROOT AREA (BSA CORRECTED): 1.5
BH CV ECHO MEAS - AO ROOT AREA: 10.8 CM^2
BH CV ECHO MEAS - AO ROOT DIAM: 3.7 CM
BH CV ECHO MEAS - AO V2 MAX: 95.7 CM/SEC
BH CV ECHO MEAS - AO V2 MEAN: 70.6 CM/SEC
BH CV ECHO MEAS - AO V2 VTI: 18.7 CM
BH CV ECHO MEAS - ASC AORTA: 3.4 CM
BH CV ECHO MEAS - AVA(I,A): 2.7 CM^2
BH CV ECHO MEAS - AVA(I,D): 2.7 CM^2
BH CV ECHO MEAS - AVA(V,A): 2.6 CM^2
BH CV ECHO MEAS - AVA(V,D): 2.6 CM^2
BH CV ECHO MEAS - BSA(HAYCOCK): 2.6 M^2
BH CV ECHO MEAS - BSA: 2.5 M^2
BH CV ECHO MEAS - BZI_BMI: 36.2 KILOGRAMS/M^2
BH CV ECHO MEAS - BZI_METRIC_HEIGHT: 185 CM
BH CV ECHO MEAS - BZI_METRIC_WEIGHT: 124 KG
BH CV ECHO MEAS - EDV(CUBED): 81.2 ML
BH CV ECHO MEAS - EDV(MOD-SP2): 64.3 ML
BH CV ECHO MEAS - EDV(MOD-SP4): 69.1 ML
BH CV ECHO MEAS - EDV(TEICH): 84.4 ML
BH CV ECHO MEAS - EF(CUBED): 64.4 %
BH CV ECHO MEAS - EF(MOD-BP): 60 %
BH CV ECHO MEAS - EF(MOD-SP2): 64.2 %
BH CV ECHO MEAS - EF(MOD-SP4): 55.7 %
BH CV ECHO MEAS - EF(TEICH): 56.1 %
BH CV ECHO MEAS - ESV(CUBED): 28.9 ML
BH CV ECHO MEAS - ESV(MOD-SP2): 23 ML
BH CV ECHO MEAS - ESV(MOD-SP4): 30.6 ML
BH CV ECHO MEAS - ESV(TEICH): 37 ML
BH CV ECHO MEAS - FS: 29.1 %
BH CV ECHO MEAS - IVS/LVPW: 0.97
BH CV ECHO MEAS - IVSD: 1 CM
BH CV ECHO MEAS - LAT PEAK E' VEL: 12.1 CM/SEC
BH CV ECHO MEAS - LV DIASTOLIC VOL/BSA (35-75): 28.2 ML/M^2
BH CV ECHO MEAS - LV MASS(C)D: 151.2 GRAMS
BH CV ECHO MEAS - LV MASS(C)DI: 61.6 GRAMS/M^2
BH CV ECHO MEAS - LV MAX PG: 3.2 MMHG
BH CV ECHO MEAS - LV MEAN PG: 2 MMHG
BH CV ECHO MEAS - LV SYSTOLIC VOL/BSA (12-30): 12.5 ML/M^2
BH CV ECHO MEAS - LV V1 MAX: 89.2 CM/SEC
BH CV ECHO MEAS - LV V1 MEAN: 64.1 CM/SEC
BH CV ECHO MEAS - LV V1 VTI: 17.7 CM
BH CV ECHO MEAS - LVIDD: 4.3 CM
BH CV ECHO MEAS - LVIDS: 3.1 CM
BH CV ECHO MEAS - LVLD AP2: 7.7 CM
BH CV ECHO MEAS - LVLD AP4: 8.1 CM
BH CV ECHO MEAS - LVLS AP2: 6.9 CM
BH CV ECHO MEAS - LVLS AP4: 7.8 CM
BH CV ECHO MEAS - LVOT AREA (M): 2.8 CM^2
BH CV ECHO MEAS - LVOT AREA: 2.8 CM^2
BH CV ECHO MEAS - LVOT DIAM: 1.9 CM
BH CV ECHO MEAS - LVPWD: 1.1 CM
BH CV ECHO MEAS - MV DEC TIME: 124 SEC
BH CV ECHO MEAS - MV E MAX VEL: 127 CM/SEC
BH CV ECHO MEAS - PA ACC TIME: 0.07 SEC
BH CV ECHO MEAS - PA MAX PG: 3.6 MMHG
BH CV ECHO MEAS - PA PR(ACCEL): 48 MMHG
BH CV ECHO MEAS - PA V2 MAX: 95.3 CM/SEC
BH CV ECHO MEAS - QP/QS: 1.6
BH CV ECHO MEAS - RV MEAN PG: 1 MMHG
BH CV ECHO MEAS - RV V1 MEAN: 39.1 CM/SEC
BH CV ECHO MEAS - RV V1 VTI: 9.6 CM
BH CV ECHO MEAS - RVOT AREA: 8.6 CM^2
BH CV ECHO MEAS - RVOT DIAM: 3.3 CM
BH CV ECHO MEAS - SI(AO): 82 ML/M^2
BH CV ECHO MEAS - SI(CUBED): 21.3 ML/M^2
BH CV ECHO MEAS - SI(LVOT): 20.5 ML/M^2
BH CV ECHO MEAS - SI(MOD-SP2): 16.8 ML/M^2
BH CV ECHO MEAS - SI(MOD-SP4): 15.7 ML/M^2
BH CV ECHO MEAS - SI(TEICH): 19.3 ML/M^2
BH CV ECHO MEAS - SV(AO): 201.1 ML
BH CV ECHO MEAS - SV(CUBED): 52.2 ML
BH CV ECHO MEAS - SV(LVOT): 50.2 ML
BH CV ECHO MEAS - SV(MOD-SP2): 41.3 ML
BH CV ECHO MEAS - SV(MOD-SP4): 38.5 ML
BH CV ECHO MEAS - SV(RVOT): 82.3 ML
BH CV ECHO MEAS - SV(TEICH): 47.4 ML
BH CV ECHO MEAS - TAPSE (>1.6): 2 CM
BH CV XLRA - RV BASE: 4.3 CM
BH CV XLRA - RV LENGTH: 7.3 CM
BH CV XLRA - TDI S': 20.9 CM/SEC
LEFT ATRIUM VOLUME INDEX: 28.2 ML/M2
LV EF 2D ECHO EST: 60 %
MAXIMAL PREDICTED HEART RATE: 150 BPM
STRESS TARGET HR: 128 BPM

## 2021-10-26 PROCEDURE — 93226 XTRNL ECG REC<48 HR SCAN A/R: CPT

## 2021-10-26 PROCEDURE — 93306 TTE W/DOPPLER COMPLETE: CPT

## 2021-10-26 PROCEDURE — 25010000002 PERFLUTREN (DEFINITY) 8.476 MG IN SODIUM CHLORIDE (PF) 0.9 % 10 ML INJECTION: Performed by: INTERNAL MEDICINE

## 2021-10-26 PROCEDURE — 93225 XTRNL ECG REC<48 HRS REC: CPT

## 2021-10-26 PROCEDURE — 93306 TTE W/DOPPLER COMPLETE: CPT | Performed by: INTERNAL MEDICINE

## 2021-10-26 RX ADMIN — SODIUM CHLORIDE 2 ML: 9 INJECTION INTRAMUSCULAR; INTRAVENOUS; SUBCUTANEOUS at 14:44

## 2021-10-26 NOTE — TELEPHONE ENCOUNTER
----- Message from Donnie Berger III, MD sent at 10/26/2021  3:10 PM EDT -----  Please call- normal results

## 2021-11-01 LAB
MAXIMAL PREDICTED HEART RATE: 150 BPM
STRESS TARGET HR: 128 BPM

## 2021-11-01 PROCEDURE — 93227 XTRNL ECG REC<48 HR R&I: CPT | Performed by: INTERNAL MEDICINE

## 2021-11-10 DIAGNOSIS — I48.0 PAROXYSMAL ATRIAL FIBRILLATION (HCC): Chronic | ICD-10-CM

## 2021-11-10 DIAGNOSIS — I10 ESSENTIAL HYPERTENSION: Chronic | ICD-10-CM

## 2021-11-11 ENCOUNTER — OFFICE VISIT (OUTPATIENT)
Dept: CARDIOLOGY | Facility: CLINIC | Age: 71
End: 2021-11-11

## 2021-11-11 VITALS
BODY MASS INDEX: 37.77 KG/M2 | OXYGEN SATURATION: 96 % | WEIGHT: 285 LBS | HEIGHT: 73 IN | HEART RATE: 105 BPM | DIASTOLIC BLOOD PRESSURE: 84 MMHG | SYSTOLIC BLOOD PRESSURE: 142 MMHG | RESPIRATION RATE: 16 BRPM

## 2021-11-11 DIAGNOSIS — I10 PRIMARY HYPERTENSION: Chronic | ICD-10-CM

## 2021-11-11 DIAGNOSIS — E66.01 SEVERE OBESITY (BMI 35.0-39.9) WITH COMORBIDITY (HCC): ICD-10-CM

## 2021-11-11 DIAGNOSIS — I48.0 PAROXYSMAL ATRIAL FIBRILLATION (HCC): Primary | Chronic | ICD-10-CM

## 2021-11-11 PROCEDURE — 99214 OFFICE O/P EST MOD 30 MIN: CPT | Performed by: NURSE PRACTITIONER

## 2021-11-11 PROCEDURE — 93000 ELECTROCARDIOGRAM COMPLETE: CPT | Performed by: NURSE PRACTITIONER

## 2021-11-11 RX ORDER — RIVAROXABAN 20 MG/1
TABLET, FILM COATED ORAL
Qty: 90 TABLET | Refills: 0 | Status: SHIPPED | OUTPATIENT
Start: 2021-11-11 | End: 2022-02-14

## 2021-11-11 NOTE — PROGRESS NOTES
Date of Office Visit: 2021  Encounter Provider: AMBREEN Lizama  Place of Service: [facility] CARDIOLOGY  Patient Name: Hakeem Onofre Jr.  :1950  Primary Cardiologist: Dr. Berger    CC:  Follow up testing    Dear      HPI: Hakeem Onofre Jr. is a pleasant 71 y.o. male who presents 2021 for cardiac follow up.  He is a new patient to me and I reviewed his past medical records.  He has patient of Dr. Berger.     He had a hospitalization at [facility] in 2018.    Seen in our office in  to follow-up from that hospitalization as well.  He states he and his wife were in [facility] to meet their new grandchild.  The day they were to leave, the patient had 3 episodes of nausea, vomiting and diarrhea.  By the third episode he saw blood in his vomitus and in his stool.  His son called EMS and he was taken to the hospital.  He was admitted to the ICU with coffee-ground emesis, dark stools and found to have supratherapeutic INR on Xarelto, bradycardic (HR 49), hypotension (70/44).  He underwent an EGD that showed acute gastritis and non-bleeding 1.2 cm gastric ulcer that was clipped.  He received a total of 4 units of packed red blood cells as well as 1 unit of FFP.  His course was complicated by atrial flutter with RVR and he was started back on metoprolol and sotalol.  He was then restarted on a heparin drip for anticoagulation after GI clearance but patient unfortunately had a bloody bowel movement the same day after which time the heparin was stopped.  He had not had a previous GI bleeding history and states he was started on Xarelto about 5 years ago without any bleeding complications.  He had had a colonoscopy perhaps 10 years ago which showed polyps that were resected but never had an EGD before this admission.     H. pylori serology was positive, stool Ag negative.  He was instructed to have a repeat EGD in 6 to 8 weeks to assess the  healing of his gastric ulcer.  He was started on Protonix 40 mg twice daily.  His hemoglobin day of discharge was 7.3.       Patient also has a history of atrial flutter that was diagnosed in 2012 and at that point he underwent atrial flutter ablation.    He saw Dr. Berger 10/7/2021.  He stated that over the last month he felt his pulse was irregular and was concerned that he was back in atrial fibrillation.  He did not have any other symptoms at the time.  He stated he had been compliant with his medications.  An echo and Holter were ordered as follows:    October 2021, echo-interpretation Summary  · There is calcification of the aortic valve.  · Estimated left ventricular EF = 60% Left ventricular systolic function is normal.  · Left ventricular diastolic function was indeterminate.     Holter monitor-11/2021-interpretation Summary  · An abnormal monitor study.  · Predominant underlying rhythm is atrial fibrillation with average heart rate of 92 bpm: Range  bpm  · No patient diary submitted.     He presents today to go over the results of his Holter.  He states he really cannot feel his heart racing or skipping but he has an anxious feeling and cannot feel his radial pulse as he normally can when it is regular.  He denies any shortness of breath, lower extremity edema, dizziness or lightheadedness.  He states he does not have any chest pain or chest pressure or fatigue.  He is compliant with his CPAP.  Take his medications as directed.  He has not missed any Xarelto in the last 3 weeks.  He really has not missed any Xarelto since he has been put back on it but especially in the last 2 weeks.  We did discuss the importance of not missing any doses of his blood thinner.  He voiced understanding.    Past Medical History:   Diagnosis Date   • Arrhythmia    • Atrial fibrillation (HCC)    • Colon polyp    • Gastric ulcer    • Gastric ulcer    • GERD (gastroesophageal reflux disease)    • History of transfusion 2019     gastric ulcer   • Hypertension    • Sleep apnea     uses cpap       Past Surgical History:   Procedure Laterality Date   • ABLATION OF DYSRHYTHMIC FOCUS  2012    for treatment of Afib/flutter   • CARDIAC CATHETERIZATION     • CATARACT EXTRACTION     • COLONOSCOPY N/A 8/13/2019    Procedure: COLONOSCOPY TO CECUM AND TI WITH COLD SNARE POLYPECTOMIES;  Surgeon: Duc Medley MD;  Location: [facility] ENDOSCOPY;  Service: Gastroenterology   • ENDOSCOPY     • ENDOSCOPY N/A 8/13/2019    Procedure: ESOPHAGOGASTRODUODENOSCOPY WITH COLD BIOPSIES;  Surgeon: Duc Medley MD;  Location: [facility] ENDOSCOPY;  Service: Gastroenterology       Social History     Socioeconomic History   • Marital status:    Tobacco Use   • Smoking status: Never Smoker   • Smokeless tobacco: Never Used   • Tobacco comment: caffiene occ   Substance and Sexual Activity   • Alcohol use: Yes     Alcohol/week: 3.0 standard drinks     Types: 3 Cans of beer per week     Comment: occ   • Drug use: No   • Sexual activity: Defer       Family History   Problem Relation Age of Onset   • Heart attack Father    • Hypertension Father    • Heart disease Father        The following portion of the patient's history were reviewed and updated as appropriate: past medical history, past surgical history, past social history, past family history, allergies, current medications, and problem list.    Review of Systems   Constitutional: Negative for diaphoresis, fever and malaise/fatigue.   HENT: Negative for congestion, hearing loss, hoarse voice, nosebleeds and sore throat.    Eyes: Negative for photophobia, vision loss in left eye, vision loss in right eye and visual disturbance.   Cardiovascular: Positive for irregular heartbeat. Negative for chest pain, dyspnea on exertion, leg swelling, near-syncope, orthopnea, palpitations, paroxysmal nocturnal dyspnea and syncope.   Respiratory: Negative for cough, hemoptysis, shortness of breath, sleep  "disturbances due to breathing, snoring, sputum production and wheezing.    Endocrine: Negative for cold intolerance, heat intolerance, polydipsia, polyphagia and polyuria.   Hematologic/Lymphatic: Negative for bleeding problem. Does not bruise/bleed easily.   Skin: Negative for color change, dry skin, poor wound healing, rash and suspicious lesions.   Musculoskeletal: Negative for arthritis, back pain, falls, gout, joint pain, joint swelling, muscle cramps, muscle weakness and myalgias.   Gastrointestinal: Negative for bloating, abdominal pain, constipation, diarrhea, dysphagia, melena, nausea and vomiting.   Neurological: Negative for excessive daytime sleepiness, dizziness, headaches, light-headedness, loss of balance, numbness, paresthesias, seizures, vertigo and weakness.   Psychiatric/Behavioral: Negative for depression, memory loss and substance abuse. The patient is not nervous/anxious.        No Known Allergies      Current Outpatient Medications:   •  Ferrous Sulfate (IRON PO), Take 1 tablet by mouth Daily., Disp: , Rfl:   •  metoprolol succinate XL (TOPROL-XL) 100 MG 24 hr tablet, Take 1 tablet by mouth once daily, Disp: 90 tablet, Rfl: 0  •  pantoprazole (PROTONIX) 40 MG EC tablet, Take 1 tablet by mouth once daily, Disp: 90 tablet, Rfl: 0  •  sotalol (BETAPACE) 160 MG tablet, Take 1 tablet by mouth twice daily, Disp: 180 tablet, Rfl: 0  •  Xarelto 20 MG tablet, Take 1 tablet by mouth once daily, Disp: 90 tablet, Rfl: 0        Objective:     Vitals:    11/11/21 1252   BP: 142/84   Pulse: 105   Resp: 16   SpO2: 96%   Weight: 129 kg (285 lb)   Height: 185.4 cm (73\")     Body mass index is 37.6 kg/m².      Vitals reviewed.   Constitutional:       General: Not in acute distress.     Appearance: Well-developed and not in distress. Obese.   Eyes:      General:         Right eye: No discharge.         Left eye: No discharge.      Conjunctiva/sclera: Conjunctivae normal.   HENT:      Head: Normocephalic and " atraumatic.      Right Ear: External ear normal.      Left Ear: External ear normal.      Nose: Nose normal.   Neck:      Thyroid: No thyromegaly.      Vascular: No JVD.      Trachea: No tracheal deviation.      Lymphadenopathy: No cervical adenopathy.   Pulmonary:      Effort: Pulmonary effort is normal. No respiratory distress.      Breath sounds: Normal breath sounds. No wheezing. No rales.   Chest:      Chest wall: Not tender to palpatation.   Cardiovascular:      Normal rate. Irregularly irregular rhythm.      No gallop.   Pulses:     Intact distal pulses.   Edema:     Peripheral edema absent.   Abdominal:      General: There is no distension.      Palpations: Abdomen is soft.      Tenderness: There is no abdominal tenderness.   Musculoskeletal: Normal range of motion.         General: No tenderness or deformity.      Cervical back: Normal range of motion and neck supple. Skin:     General: Skin is warm and dry.      Findings: No erythema or rash.   Neurological:      Mental Status: Alert and oriented to person, place, and time.      Coordination: Coordination normal.   Psychiatric:         Attention and Perception: Attention normal.         Mood and Affect: Mood normal.         Speech: Speech normal.         Behavior: Behavior normal.         Thought Content: Thought content normal.         Cognition and Memory: Cognition normal.         Judgment: Judgment normal.               ECG 12 Lead    Date/Time: 11/11/2021 1:04 PM  Performed by: Lucila Richards APRN  Authorized by: Lucila Richards APRN   Comparison: compared with previous ECG from 10/7/2021  Similar to previous ECG  Rhythm: atrial fibrillation  Rate: normal  Conduction: conduction normal  ST Segments: ST segments normal  T Waves: T waves normal  QRS axis: normal (borderline right axis)    Clinical impression: abnormal EKG              Assessment:       Diagnosis Plan   1. Paroxysmal atrial fibrillation (HCC)     2. Primary hypertension     3. Severe  obesity (BMI 35.0-39.9) with comorbidity (HCC)            Plan:      1. Atrial Fibrillation and Atrial Flutter  Assessment  • The patient has paroxysmal atrial fibrillation.  He remains in atrial fibrillation.  He is on sotalol and metoprolol succinate.  He is also on Xarelto.  He has not missed any doses of any of his medications.  • This is non-valvular in etiology  • The patient's CHADS2-VASc score is 2  • A [id number] score of 2 or more is considered a high risk for a thromboembolic event     Plan  • Attempt to maintain sinus rhythm  • Continue rivaroxaban for antithrombotic therapy, bleeding issues discussed.  We discussed the importance of not missing any of his blood thinner prior to cardioversion.  • Continue sotalol for rhythm control  • Patient is back in atrial fibrillation, he is not sure along but may be a month from his estimation.  Place a Holter monitor as well as obtain an echocardiogram.     2.  Chronic anticoagulation, old history of GI bleed but currently doing well with no bleeding.    3.  Hypertension-stable     4.  Severe obesity with a BMI of 37.6-he would benefit from a weight loss program.  He has not really been exercising since he has been in atrial fibrillation because he does not feel well doing so.    Set up cardioversion in [place] with Dr. Berger for the week of 11/29/2021.    as always, it has been a pleasure to participate in your patient's care. Thank you.       Sincerely,       AMBREEN Lizama      Current Outpatient Medications:   •  Ferrous Sulfate (IRON PO), Take 1 tablet by mouth Daily., Disp: , Rfl:   •  metoprolol succinate XL (TOPROL-XL) 100 MG 24 hr tablet, Take 1 tablet by mouth once daily, Disp: 90 tablet, Rfl: 0  •  pantoprazole (PROTONIX) 40 MG EC tablet, Take 1 tablet by mouth once daily, Disp: 90 tablet, Rfl: 0  •  sotalol (BETAPACE) 160 MG tablet, Take 1 tablet by mouth twice daily, Disp: 180 tablet, Rfl: 0  •  Xarelto 20 MG tablet, Take 1 tablet by mouth once  daily, Disp: 90 tablet, Rfl: 0    Dictated utilizing Dragon dictation

## 2021-12-13 ENCOUNTER — TRANSCRIBE ORDERS (OUTPATIENT)
Dept: CARDIOLOGY | Facility: CLINIC | Age: 71
End: 2021-12-13

## 2021-12-13 DIAGNOSIS — Z01.818 OTHER SPECIFIED PRE-OPERATIVE EXAMINATION: ICD-10-CM

## 2021-12-13 DIAGNOSIS — Z13.6 SCREENING FOR CARDIOVASCULAR CONDITION: ICD-10-CM

## 2021-12-13 DIAGNOSIS — Z01.810 PREPROCEDURAL CARDIOVASCULAR EXAMINATION: Primary | ICD-10-CM

## 2021-12-14 ENCOUNTER — APPOINTMENT (OUTPATIENT)
Dept: LAB | Facility: HOSPITAL | Age: 71
End: 2021-12-14

## 2021-12-14 ENCOUNTER — TELEPHONE (OUTPATIENT)
Dept: CARDIOLOGY | Facility: CLINIC | Age: 71
End: 2021-12-14

## 2021-12-14 ENCOUNTER — LAB (OUTPATIENT)
Dept: LAB | Facility: HOSPITAL | Age: 71
End: 2021-12-14

## 2021-12-14 DIAGNOSIS — Z13.6 SCREENING FOR CARDIOVASCULAR CONDITION: ICD-10-CM

## 2021-12-14 DIAGNOSIS — Z01.818 OTHER SPECIFIED PRE-OPERATIVE EXAMINATION: ICD-10-CM

## 2021-12-14 DIAGNOSIS — Z01.810 PREPROCEDURAL CARDIOVASCULAR EXAMINATION: ICD-10-CM

## 2021-12-14 LAB — SARS-COV-2 RNA PNL SPEC NAA+PROBE: NOT DETECTED

## 2021-12-14 PROCEDURE — C9803 HOPD COVID-19 SPEC COLLECT: HCPCS

## 2021-12-14 PROCEDURE — 87635 SARS-COV-2 COVID-19 AMP PRB: CPT | Performed by: INTERNAL MEDICINE

## 2021-12-14 NOTE — TELEPHONE ENCOUNTER
----- Message from Donnie Berger III, MD sent at 12/14/2021 12:03 PM EST -----  Please call- normal results

## 2021-12-15 ENCOUNTER — ANESTHESIA EVENT (OUTPATIENT)
Dept: CARDIOLOGY | Facility: HOSPITAL | Age: 71
End: 2021-12-15

## 2021-12-15 DIAGNOSIS — I48.91 ATRIAL FIBRILLATION, UNSPECIFIED TYPE (HCC): Primary | ICD-10-CM

## 2021-12-15 NOTE — TELEPHONE ENCOUNTER
2nd attempt made to call results.  Will continue to try and reach patient.    Xuan Valles RN  Triage [facility] [place]

## 2021-12-16 ENCOUNTER — APPOINTMENT (OUTPATIENT)
Dept: POSTOP/PACU | Facility: HOSPITAL | Age: 71
End: 2021-12-16

## 2021-12-16 ENCOUNTER — ANESTHESIA (OUTPATIENT)
Dept: CARDIOLOGY | Facility: HOSPITAL | Age: 71
End: 2021-12-16

## 2021-12-16 ENCOUNTER — HOSPITAL ENCOUNTER (OUTPATIENT)
Dept: CARDIOLOGY | Facility: HOSPITAL | Age: 71
Discharge: HOME | End: 2021-12-16

## 2021-12-16 VITALS
HEIGHT: 73 IN | OXYGEN SATURATION: 96 % | DIASTOLIC BLOOD PRESSURE: 78 MMHG | HEART RATE: 63 BPM | SYSTOLIC BLOOD PRESSURE: 146 MMHG | WEIGHT: 280 LBS | TEMPERATURE: 97.3 F | RESPIRATION RATE: 18 BRPM | BODY MASS INDEX: 37.11 KG/M2

## 2021-12-16 DIAGNOSIS — I48.91 ATRIAL FIBRILLATION, UNSPECIFIED TYPE (HCC): ICD-10-CM

## 2021-12-16 LAB
ANION GAP SERPL CALCULATED.3IONS-SCNC: 11.2 MMOL/L (ref 5–15)
BUN SERPL-MCNC: 14 MG/DL (ref 8–23)
BUN/CREAT SERPL: 15.7 (ref 7–25)
CALCIUM SPEC-SCNC: 9.2 MG/DL (ref 8.6–10.5)
CHLORIDE SERPL-SCNC: 102 MMOL/L (ref 98–107)
CO2 SERPL-SCNC: 25.8 MMOL/L (ref 22–29)
CREAT SERPL-MCNC: 0.89 MG/DL (ref 0.76–1.27)
GFR SERPL CREATININE-BSD FRML MDRD: 85 ML/MIN/1.73
GLUCOSE SERPL-MCNC: 117 MG/DL (ref 65–99)
MAXIMAL PREDICTED HEART RATE: 150 BPM
POTASSIUM SERPL-SCNC: 4.5 MMOL/L (ref 3.5–5.2)
QT INTERVAL: 336 MS
SODIUM SERPL-SCNC: 139 MMOL/L (ref 136–145)
STRESS TARGET HR: 128 BPM

## 2021-12-16 PROCEDURE — 92960 CARDIOVERSION ELECTRIC EXT: CPT | Performed by: INTERNAL MEDICINE

## 2021-12-16 PROCEDURE — 92960 CARDIOVERSION ELECTRIC EXT: CPT

## 2021-12-16 PROCEDURE — 25010000002 PROPOFOL 10 MG/ML EMULSION: Performed by: NURSE ANESTHETIST, CERTIFIED REGISTERED

## 2021-12-16 PROCEDURE — 93010 ELECTROCARDIOGRAM REPORT: CPT | Performed by: INTERNAL MEDICINE

## 2021-12-16 PROCEDURE — 80048 BASIC METABOLIC PNL TOTAL CA: CPT | Performed by: INTERNAL MEDICINE

## 2021-12-16 PROCEDURE — 93005 ELECTROCARDIOGRAM TRACING: CPT | Performed by: INTERNAL MEDICINE

## 2021-12-16 RX ORDER — SODIUM CHLORIDE 0.9 % (FLUSH) 0.9 %
10 SYRINGE (ML) INJECTION AS NEEDED
Status: DISCONTINUED | OUTPATIENT
Start: 2021-12-16 | End: 2021-12-17 | Stop reason: HOSPADM

## 2021-12-16 RX ORDER — SODIUM CHLORIDE 9 MG/ML
75 INJECTION, SOLUTION INTRAVENOUS CONTINUOUS
Status: DISCONTINUED | OUTPATIENT
Start: 2021-12-16 | End: 2021-12-17 | Stop reason: HOSPADM

## 2021-12-16 RX ORDER — METOPROLOL SUCCINATE 50 MG/1
50 TABLET, EXTENDED RELEASE ORAL DAILY
Qty: 90 TABLET | Refills: 3 | Status: SHIPPED | OUTPATIENT
Start: 2021-12-16 | End: 2022-10-13

## 2021-12-16 RX ORDER — SODIUM CHLORIDE 0.9 % (FLUSH) 0.9 %
3 SYRINGE (ML) INJECTION EVERY 12 HOURS SCHEDULED
Status: DISCONTINUED | OUTPATIENT
Start: 2021-12-16 | End: 2021-12-17 | Stop reason: HOSPADM

## 2021-12-16 RX ORDER — LIDOCAINE HYDROCHLORIDE 10 MG/ML
0.1 INJECTION, SOLUTION EPIDURAL; INFILTRATION; INTRACAUDAL; PERINEURAL ONCE AS NEEDED
Status: DISCONTINUED | OUTPATIENT
Start: 2021-12-16 | End: 2021-12-17 | Stop reason: HOSPADM

## 2021-12-16 RX ORDER — PROPOFOL 10 MG/ML
VIAL (ML) INTRAVENOUS AS NEEDED
Status: DISCONTINUED | OUTPATIENT
Start: 2021-12-16 | End: 2021-12-16 | Stop reason: SURG

## 2021-12-16 RX ADMIN — PROPOFOL 120 MG: 10 INJECTION, EMULSION INTRAVENOUS at 08:44

## 2021-12-16 RX ADMIN — SODIUM CHLORIDE 75 ML/HR: 9 INJECTION, SOLUTION INTRAVENOUS at 06:25

## 2021-12-16 NOTE — ANESTHESIA POSTPROCEDURE EVALUATION
"Patient: Hakeem Onofre Jr.    Procedure Summary     Date: 12/16/21 Room / Location: [facility] CATH LAB    Anesthesia Start: 0829 Anesthesia Stop: 0849    Procedure: CARDIOVERSION EXTERNAL IN CARDIOLOGY DEPARTMENT Diagnosis:       Atrial fibrillation, unspecified type (HCC)      (AF)    Scheduled Providers:  Provider: Jerry Pritchard MD    Anesthesia Type: MAC ASA Status: 3          Anesthesia Type: MAC    Vitals  Vitals Value Taken Time   /83 12/16/21 0916   Temp     Pulse 65 12/16/21 0926   Resp 20 12/16/21 0907   SpO2 96 % 12/16/21 0926   Vitals shown include unvalidated device data.        Post Anesthesia Care and Evaluation    Patient location during evaluation: bedside  Patient participation: complete - patient participated  Level of consciousness: awake and alert  Pain score: 0  Pain management: adequate  Airway patency: patent  Anesthetic complications: No anesthetic complications    Cardiovascular status: acceptable  Respiratory status: acceptable  Hydration status: acceptable    Comments: /91   Pulse 68   Temp 36.3 °C (97.3 °F) (Temporal)   Resp 20   Ht 185.4 cm (73\")   Wt 127 kg (280 lb)   SpO2 96%   BMI 36.94 kg/m²       "

## 2021-12-16 NOTE — PROCEDURES
"Cardioversion External in Cardiology Department    Date/Time: 12/16/2021 8:47 AM  Performed by: Donnie Berger III, MD  Authorized by: Donnie Berger III, MD   Consent: Verbal consent obtained. Written consent obtained.  Risks and benefits: risks, benefits and alternatives were discussed  Consent given by: patient  Patient understanding: patient states understanding of the procedure being performed  Patient consent: the patient's understanding of the procedure matches consent given  Procedure consent: procedure consent matches procedure scheduled  Patient identity confirmed: verbally with patient and arm band  Time out: Immediately prior to procedure a \"time out\" was called to verify the correct patient, procedure, equipment, support staff and site/side marked as required.    Sedation:  Patient sedated: yes  Sedatives: see MAR for details  Analgesia: see MAR for details  Vitals: Vital signs were monitored during sedation.    Patient tolerance: patient tolerated the procedure well with no immediate complications  Comments: Synchronized cardioversion was performed.  150 J (biphasic) once with resumption of sinus rhythm.        "

## 2021-12-16 NOTE — ANESTHESIA PREPROCEDURE EVALUATION
Anesthesia Evaluation     Patient summary reviewed and Nursing notes reviewed                Airway   Mallampati: II  TM distance: >3 FB  Neck ROM: full  Possible difficult intubation and Large neck circumference  Dental - normal exam     Pulmonary - normal exam   (+) sleep apnea,   Cardiovascular     ECG reviewed  Rhythm: irregular  Rate: normal    (+) hypertension 2 medications or greater, dysrhythmias Atrial Fib,     ROS comment: Hx aflutter ablation at [facility] ago/in afib now/EF 60% by ECHO 10/21  PE comment: Afib    Neuro/Psych  GI/Hepatic/Renal/Endo    (+) obesity, morbid obesity, GERD, PUD, GI bleeding resolved,     Musculoskeletal     Abdominal   (+) obese,    Substance History      OB/GYN          Other                        Anesthesia Plan    ASA 3     MAC     intravenous induction     Anesthetic plan, all risks, benefits, and alternatives have been provided, discussed and informed consent has been obtained with: patient.    Plan discussed with CRNA.

## 2021-12-28 DIAGNOSIS — I48.0 PAROXYSMAL ATRIAL FIBRILLATION (HCC): Chronic | ICD-10-CM

## 2021-12-28 DIAGNOSIS — I10 ESSENTIAL HYPERTENSION: Chronic | ICD-10-CM

## 2021-12-29 RX ORDER — SOTALOL HYDROCHLORIDE 160 MG/1
TABLET ORAL
Qty: 180 TABLET | Refills: 1 | Status: SHIPPED | OUTPATIENT
Start: 2021-12-29 | End: 2022-07-06

## 2022-02-12 DIAGNOSIS — I48.0 PAROXYSMAL ATRIAL FIBRILLATION: Chronic | ICD-10-CM

## 2022-02-12 DIAGNOSIS — I10 ESSENTIAL HYPERTENSION: Chronic | ICD-10-CM

## 2022-02-14 RX ORDER — RIVAROXABAN 20 MG/1
TABLET, FILM COATED ORAL
Qty: 90 TABLET | Refills: 0 | Status: SHIPPED | OUTPATIENT
Start: 2022-02-14 | End: 2022-05-16

## 2022-02-14 NOTE — TELEPHONE ENCOUNTER
Rx Refill Note  Requested Prescriptions     Pending Prescriptions Disp Refills   • Xarelto 20 MG tablet [Pharmacy Med Name: Xarelto 20 MG Oral Tablet] 90 tablet 0     Sig: Take 1 tablet by mouth once daily      Last office visit with prescribing clinician: 11/11/2021      Next office visit with prescribing clinician: 10/13/2022 GERA Stephenson MA  02/14/22, 16:16 EST

## 2022-04-05 DIAGNOSIS — K21.9 GASTROESOPHAGEAL REFLUX DISEASE, UNSPECIFIED WHETHER ESOPHAGITIS PRESENT: ICD-10-CM

## 2022-04-05 RX ORDER — PANTOPRAZOLE SODIUM 40 MG/1
TABLET, DELAYED RELEASE ORAL
Qty: 90 TABLET | Refills: 0 | Status: SHIPPED | OUTPATIENT
Start: 2022-04-05

## 2022-04-05 NOTE — TELEPHONE ENCOUNTER
Pt past due for appt  Refill protonix once may will need appt on schedule prior next refill request  Will send my chart notification

## 2022-05-14 DIAGNOSIS — I10 ESSENTIAL HYPERTENSION: Chronic | ICD-10-CM

## 2022-05-14 DIAGNOSIS — I48.0 PAROXYSMAL ATRIAL FIBRILLATION: Chronic | ICD-10-CM

## 2022-05-16 RX ORDER — RIVAROXABAN 20 MG/1
TABLET, FILM COATED ORAL
Qty: 90 TABLET | Refills: 0 | Status: SHIPPED | OUTPATIENT
Start: 2022-05-16 | End: 2022-08-18

## 2022-05-16 NOTE — TELEPHONE ENCOUNTER
Rx Refill Note  Requested Prescriptions     Pending Prescriptions Disp Refills   • Xarelto 20 MG tablet [Pharmacy Med Name: Xarelto 20 MG Oral Tablet] 90 tablet 0     Sig: Take 1 tablet by mouth once daily      Last office visit with prescribing clinician: 11/11/2021    WILEY  Next office visit with prescribing clinician: 10/13/2022  GERA Stephenson MA  05/16/22, 08:39 EDT

## 2022-07-03 DIAGNOSIS — I48.0 PAROXYSMAL ATRIAL FIBRILLATION: Chronic | ICD-10-CM

## 2022-07-03 DIAGNOSIS — I10 ESSENTIAL HYPERTENSION: Chronic | ICD-10-CM

## 2022-07-06 RX ORDER — SOTALOL HYDROCHLORIDE 160 MG/1
TABLET ORAL
Qty: 180 TABLET | Refills: 0 | Status: SHIPPED | OUTPATIENT
Start: 2022-07-06 | End: 2022-10-13 | Stop reason: SDUPTHER

## 2022-08-18 DIAGNOSIS — I10 ESSENTIAL HYPERTENSION: Chronic | ICD-10-CM

## 2022-08-18 DIAGNOSIS — I48.0 PAROXYSMAL ATRIAL FIBRILLATION: Chronic | ICD-10-CM

## 2022-08-18 RX ORDER — RIVAROXABAN 20 MG/1
TABLET, FILM COATED ORAL
Qty: 90 TABLET | Refills: 0 | Status: SHIPPED | OUTPATIENT
Start: 2022-08-18 | End: 2022-11-27 | Stop reason: SDUPTHER

## 2022-10-13 ENCOUNTER — OFFICE VISIT (OUTPATIENT)
Dept: CARDIOLOGY | Facility: CLINIC | Age: 72
End: 2022-10-13

## 2022-10-13 VITALS
DIASTOLIC BLOOD PRESSURE: 86 MMHG | HEART RATE: 111 BPM | SYSTOLIC BLOOD PRESSURE: 130 MMHG | WEIGHT: 277 LBS | HEIGHT: 73 IN | BODY MASS INDEX: 36.71 KG/M2

## 2022-10-13 DIAGNOSIS — I10 PRIMARY HYPERTENSION: Chronic | ICD-10-CM

## 2022-10-13 DIAGNOSIS — G47.33 OSA ON CPAP: ICD-10-CM

## 2022-10-13 DIAGNOSIS — E66.01 SEVERE OBESITY (BMI 35.0-39.9) WITH COMORBIDITY: ICD-10-CM

## 2022-10-13 DIAGNOSIS — I10 ESSENTIAL HYPERTENSION: Chronic | ICD-10-CM

## 2022-10-13 DIAGNOSIS — I48.0 PAROXYSMAL ATRIAL FIBRILLATION: Primary | Chronic | ICD-10-CM

## 2022-10-13 DIAGNOSIS — Z86.16 HISTORY OF 2019 NOVEL CORONAVIRUS DISEASE (COVID-19): Chronic | ICD-10-CM

## 2022-10-13 DIAGNOSIS — Z99.89 OSA ON CPAP: ICD-10-CM

## 2022-10-13 PROCEDURE — 93000 ELECTROCARDIOGRAM COMPLETE: CPT | Performed by: INTERNAL MEDICINE

## 2022-10-13 PROCEDURE — 99214 OFFICE O/P EST MOD 30 MIN: CPT | Performed by: INTERNAL MEDICINE

## 2022-10-13 RX ORDER — METOPROLOL SUCCINATE 100 MG/1
100 TABLET, EXTENDED RELEASE ORAL DAILY
Qty: 90 TABLET | Refills: 3 | Status: SHIPPED | OUTPATIENT
Start: 2022-10-13 | End: 2022-11-21 | Stop reason: HOSPADM

## 2022-10-13 RX ORDER — SOTALOL HYDROCHLORIDE 160 MG/1
160 TABLET ORAL 2 TIMES DAILY
Qty: 180 TABLET | Refills: 0 | Status: SHIPPED | OUTPATIENT
Start: 2022-10-13 | End: 2023-01-09

## 2022-10-13 NOTE — PROGRESS NOTES
Subjective:     Encounter Date: 10/13/22        Patient ID: Hakeem Onofre Jr. is a 72 y.o. male.    Chief Complaint: PAF  History of Present Illness      Patient has a history of paroxysmal atrial fibrillation. He had a cardioversion in 2017 and again in 12/2021.    He comes in today for routine follow-up.  He feels like he is back in atrial fibrillation.    Actually tells me that he had COVID in July and ever since then he just did not feel right.  He was sick with COVID for about 5 days but did not have to be hospitalized.  He then started noticing that he try to feel his pulse and just did not feel the same.  He did not call us to talk about this, he thought he just give it some time to see if it would get better.    He does have sleep apnea and is on CPAP, he says he uses it compulsively and that continues to have very good scores on his CPAP usage.    He had a hospitalization at [facility] in 2018.    Seen in our office in June to follow-up from that hospitalization as well.  He states he and his wife were in [facility] to meet their new grandchild.  The day they were to leave, the patient had 3 episodes of nausea, vomiting and diarrhea.  By the third episode he saw blood in his vomitus and in his stool.  His son called EMS and he was taken to the hospital.  He was admitted to the ICU with coffee-ground emesis, dark stools and found to have supratherapeutic INR on Xarelto, bradycardic (HR 49), hypotension (70/44).  He underwent an EGD that showed acute gastritis and non-bleeding 1.2 cm gastric ulcer that was clipped.  He received a total of 4 units of packed red blood cells as well as 1 unit of FFP.  His course was complicated by atrial flutter with RVR and he was started back on metoprolol and sotalol.  He was then restarted on a heparin drip for anticoagulation after GI clearance but patient unfortunately had a bloody bowel movement the same day after which time the heparin was  stopped.  He had not had a previous GI bleeding history and states he was started on Xarelto about 5 years ago without any bleeding complications.  He had had a colonoscopy perhaps 10 years ago which showed polyps that were resected but never had an EGD before this admission.  H. pylori serology was positive, stool Ag negative.  He was instructed to have a repeat EGD in 6 to 8 weeks to assess the healing of his gastric ulcer.  He was started on Protonix 40 mg twice daily.  His hemoglobin day of discharge was 7.3.      Patient also has a history of atrial flutter that was diagnosed in 2012 and at that point he underwent atrial flutter ablation.    The following portions of the patient's history were reviewed and updated as appropriate: allergies, current medications, past family history, past medical history, past social history, past surgical history and problem list.    Past Medical History:   Diagnosis Date   • Abnormal ECG 2012    A-Fib   • Arrhythmia    • Atrial fibrillation (HCC)    • Colon polyp    • Gastric ulcer    • Gastric ulcer    • GERD (gastroesophageal reflux disease)    • History of transfusion 2019    gastric ulcer   • Hypertension    • Sleep apnea     uses cpap       Past Surgical History:   Procedure Laterality Date   • ABLATION OF DYSRHYTHMIC FOCUS  2012    for treatment of Afib/flutter   • CARDIAC CATHETERIZATION     • CATARACT EXTRACTION     • COLONOSCOPY N/A 08/13/2019    Procedure: COLONOSCOPY TO CECUM AND TI WITH COLD SNARE POLYPECTOMIES;  Surgeon: Duc Medley MD;  Location: [facility] ENDOSCOPY;  Service: Gastroenterology   • ENDOSCOPY     • ENDOSCOPY N/A 08/13/2019    Procedure: ESOPHAGOGASTRODUODENOSCOPY WITH COLD BIOPSIES;  Surgeon: Duc Medley MD;  Location: [facility] ENDOSCOPY;  Service: Gastroenterology           ECG 12 Lead    Date/Time: 10/13/2022 2:55 PM  Performed by: Donnie Berger III, MD  Authorized by: Donnie Berger III, MD   Comparison: compared with  "previous ECG   Comparison to previous ECG: Atrial fibrillation is noted  Rhythm: atrial fibrillation  Rate: normal  Conduction: conduction normal  ST Segments: ST segments normal  T Waves: T waves normal  QRS axis: normal  Other: no other findings    Clinical impression: abnormal EKG               Objective:     Vitals:    10/13/22 1434   BP: 130/86   Pulse: 111   Weight: 126 kg (277 lb)   Height: 185.4 cm (73\")   Body mass index is 36.55 kg/m².          General Appearance:    Alert, cooperative, in no acute distress   Head:    Normocephalic, without obvious abnormality, atraumatic   Eyes:            Lids and lashes normal, conjunctivae and sclerae normal, no   icterus, no pallor, corneas clear, PERRLA   Ears:    Ears appear intact with no abnormalities noted   Throat:   No oral lesions, no thrush, oral mucosa moist   Neck:   No adenopathy, supple, trachea midline, no thyromegaly, no   carotid bruit, no JVD   Back:     No kyphosis present, no scoliosis present, no skin lesions, erythema or scars, no tenderness to percussion or palpation, range of motion normal   Lungs:     Clear to auscultation,respirations regular, even and unlabored    Heart:    irregular rhythm and normal rate, normal S1 and S2, no murmur, no gallop, no rub, no click   Chest Wall:    No abnormalities observed   Abdomen:     Normal bowel sounds, no masses, no organomegaly, soft        non-tender, non-distended, no guarding, no rebound  tenderness   Extremities:   Moves all extremities well, no edema, no cyanosis, no redness   Pulses:   Pulses palpable and equal bilaterally. Normal radial, carotid, femoral, dorsalis pedis and posterior tibial pulses bilaterally. Normal abdominal aorta   Skin:  Psychiatric:   No bleeding, bruising or rash    Alert and oriented x 3, normal mood and affect       Lab Review:           Lab Results   Component Value Date    GLUCOSE 117 (H) 12/16/2021    BUN 14 12/16/2021    CREATININE 0.89 12/16/2021    EGFRIFNONA 85 " 12/16/2021    BCR 15.7 12/16/2021    K 4.5 12/16/2021    CO2 25.8 12/16/2021    CALCIUM 9.2 12/16/2021    ALBUMIN 4.10 06/14/2017    AST 26 06/14/2017    ALT 21 06/14/2017     Results for orders placed during the hospital encounter of 10/26/21    Adult Transthoracic Echo Complete W/ Cont if Necessary Per Protocol    Interpretation Summary  · There is calcification of the aortic valve.  · Estimated left ventricular EF = 60% Left ventricular systolic function is normal.  · Left ventricular diastolic function was indeterminate.          Assessment:          Diagnosis Plan   1. Paroxysmal atrial fibrillation (HCC)  Ambulatory Referral to Cardiac Electrophysiology    sotalol (BETAPACE) 160 MG tablet      2. Primary hypertension  Ambulatory Referral to Cardiac Electrophysiology      3. ELSY on CPAP  Ambulatory Referral to Cardiac Electrophysiology      4. Essential hypertension  sotalol (BETAPACE) 160 MG tablet      5. Severe obesity (BMI 35.0-39.9) with comorbidity (HCC)        6. History of 2019 novel coronavirus disease (COVID-19)               Plan:       1.  Paroxysmal atrial fibrillation-back in atrial fibrillation with RVR.  As noted above prior cardioversions in 2017 and then again in December 2021.  He does state that his A. fib started after he had an episode of COVID in July.  Given the second recurrence I Ailyn refer him to electrophysiology for consideration of alternative therapies.  2.  History of atrial flutter status post a flutter ablation  3.  Chronic anticoagulation, history of GI bleed but currently doing well   4.  Obstructive sleep apnea on CPAP, highly compliant with CPAP  5.  Morbid obesity, complicating all aspects of care    Thank you very much for allowing us to participate in the care of this pleasant patient.  Please don't hesitate to call if I can be of assistance in any way.      Current Outpatient Medications:   •  Ferrous Sulfate (IRON PO), Take 1 tablet by mouth Daily., Disp: , Rfl:   •   metoprolol succinate XL (TOPROL-XL) 50 MG 24 hr tablet, Take 1 tablet by mouth Daily., Disp: 90 tablet, Rfl: 3  •  pantoprazole (PROTONIX) 40 MG EC tablet, TAKE 1 TABLET BY MOUTH ONCE DAILY . APPOINTMENT REQUIRED FOR FUTURE REFILLS, Disp: 90 tablet, Rfl: 0  •  sotalol (BETAPACE) 160 MG tablet, Take 1 tablet by mouth twice daily, Disp: 180 tablet, Rfl: 0  •  Xarelto 20 MG tablet, Take 1 tablet by mouth once daily, Disp: 90 tablet, Rfl: 0

## 2022-11-01 ENCOUNTER — OFFICE VISIT (OUTPATIENT)
Dept: CARDIOLOGY | Facility: CLINIC | Age: 72
End: 2022-11-01

## 2022-11-01 ENCOUNTER — TRANSCRIBE ORDERS (OUTPATIENT)
Dept: CARDIOLOGY | Facility: CLINIC | Age: 72
End: 2022-11-01

## 2022-11-01 VITALS
HEIGHT: 73 IN | WEIGHT: 280 LBS | SYSTOLIC BLOOD PRESSURE: 138 MMHG | HEART RATE: 103 BPM | DIASTOLIC BLOOD PRESSURE: 88 MMHG | BODY MASS INDEX: 37.11 KG/M2

## 2022-11-01 DIAGNOSIS — Z99.89 OSA ON CPAP: ICD-10-CM

## 2022-11-01 DIAGNOSIS — E66.01 SEVERE OBESITY (BMI 35.0-39.9) WITH COMORBIDITY: ICD-10-CM

## 2022-11-01 DIAGNOSIS — G47.33 OSA ON CPAP: ICD-10-CM

## 2022-11-01 DIAGNOSIS — Z01.810 PRE-OPERATIVE CARDIOVASCULAR EXAMINATION: Primary | ICD-10-CM

## 2022-11-01 DIAGNOSIS — K25.3 ACUTE GASTRIC ULCER WITHOUT HEMORRHAGE OR PERFORATION: ICD-10-CM

## 2022-11-01 DIAGNOSIS — I48.19 PERSISTENT ATRIAL FIBRILLATION: Chronic | ICD-10-CM

## 2022-11-01 DIAGNOSIS — I48.19 ATRIAL FIBRILLATION, PERSISTENT: Primary | ICD-10-CM

## 2022-11-01 DIAGNOSIS — Z13.6 SCREENING FOR ISCHEMIC HEART DISEASE: ICD-10-CM

## 2022-11-01 PROCEDURE — 93000 ELECTROCARDIOGRAM COMPLETE: CPT | Performed by: INTERNAL MEDICINE

## 2022-11-01 PROCEDURE — 99215 OFFICE O/P EST HI 40 MIN: CPT | Performed by: INTERNAL MEDICINE

## 2022-11-01 NOTE — PROGRESS NOTES
Date of Office Visit: 2022  Encounter Provider: Robert Busby MD  Place of Service: [facility] CARDIOLOGY  Patient Name: Hakeem Onofre Jr.  :1950    Chief Complaint   Patient presents with   • Atrial Fibrillation     New Patient Consult / ELSY HTN    :     HPI: Hakeem Onofre Jr. is a 72 y.o. male who presents today for persistent atrial fibrillation    His symptoms of atrial fibrillation are a feeling of anxiety    He had a prior flutter ablation in . He was probably started on sotalol around this time.      He has been cardioverted twice, once in , and then one in     He feels that he has been in atrial fibrillation since probably  of this year.     His has a prior hospitalization for gastric bleeding, which was attributed to taking too much advil    He has been back on rivaroxaban 20 mg daily, with no issue for several years.           Past Medical History:   Diagnosis Date   • Abnormal ECG     A-Fib   • Arrhythmia    • Atrial fibrillation (HCC)    • Colon polyp    • Gastric ulcer    • Gastric ulcer    • GERD (gastroesophageal reflux disease)    • History of 2019 novel coronavirus disease (COVID-19) 10/13/2022   • History of transfusion 2019    gastric ulcer   • Hypertension    • Sleep apnea     uses cpap       Past Surgical History:   Procedure Laterality Date   • ABLATION OF DYSRHYTHMIC FOCUS      for treatment of Afib/flutter   • CARDIAC CATHETERIZATION     • CATARACT EXTRACTION     • COLONOSCOPY N/A 2019    Procedure: COLONOSCOPY TO CECUM AND TI WITH COLD SNARE POLYPECTOMIES;  Surgeon: Duc Medley MD;  Location: [facility] ENDOSCOPY;  Service: Gastroenterology   • ENDOSCOPY     • ENDOSCOPY N/A 2019    Procedure: ESOPHAGOGASTRODUODENOSCOPY WITH COLD BIOPSIES;  Surgeon: Duc Medley MD;  Location: [facility] ENDOSCOPY;  Service: Gastroenterology       Social History     Socioeconomic History   • Marital  "status:    Tobacco Use   • Smoking status: Never   • Smokeless tobacco: Never   • Tobacco comments:     caffiene occ   Vaping Use   • Vaping Use: Never used   Substance and Sexual Activity   • Alcohol use: Yes     Alcohol/week: 3.0 standard drinks     Types: 3 Cans of beer per week     Comment: occ   • Drug use: No   • Sexual activity: Yes     Partners: Female       Family History   Problem Relation Age of Onset   • Heart attack Father    • Hypertension Father    • Heart disease Father        Review of Systems   Constitutional: Negative.   Cardiovascular: Negative.    Respiratory: Negative.    Gastrointestinal: Negative.        No Known Allergies      Current Outpatient Medications:   •  Ferrous Sulfate (IRON PO), Take 1 tablet by mouth Daily., Disp: , Rfl:   •  metoprolol succinate XL (TOPROL-XL) 100 MG 24 hr tablet, Take 1 tablet by mouth Daily., Disp: 90 tablet, Rfl: 3  •  pantoprazole (PROTONIX) 40 MG EC tablet, TAKE 1 TABLET BY MOUTH ONCE DAILY . APPOINTMENT REQUIRED FOR FUTURE REFILLS, Disp: 90 tablet, Rfl: 0  •  sotalol (BETAPACE) 160 MG tablet, Take 1 tablet by mouth 2 (Two) Times a Day., Disp: 180 tablet, Rfl: 0  •  Xarelto 20 MG tablet, Take 1 tablet by mouth once daily, Disp: 90 tablet, Rfl: 0      Objective:     Vitals:    11/01/22 1249   BP: 138/88   BP Location: Right arm   Pulse: 103   Weight: 127 kg (280 lb)   Height: 185.4 cm (73\")     Body mass index is 36.94 kg/m².    PHYSICAL EXAM:    Vitals and nursing note reviewed.   Constitutional:       Appearance: Healthy appearance. Obese.   HENT:      Head: Normocephalic and atraumatic.   Pulmonary:      Effort: Pulmonary effort is normal.      Breath sounds: Normal breath sounds.   Chest:   Breasts:     Right: Normal.      Left: Normal.   Cardiovascular:      Normal rate. Irregular rhythm.   Edema:     Peripheral edema absent.   Neurological:      General: No focal deficit present.      Mental Status: Alert and oriented to person, place, and time. "   Psychiatric:         Attention and Perception: Attention and perception normal.         Mood and Affect: Mood and affect normal.             ECG 12 Lead    Date/Time: 11/1/2022 5:34 PM  Performed by: Robert Busby MD  Authorized by: Robert Busby MD   Comparison: compared with previous ECG from 10/13/2022  Similar to previous ECG  Rhythm: atrial fibrillation        I reviewed his echocardiogram images.  He has significant left atrial enlargement.       Assessment:       Diagnosis Plan   1. Atrial fibrillation, persistent (HCC)  Case Request EP Lab: Ablation atrial fibrillation      2. Persistent atrial fibrillation (HCC)        3. Severe obesity (BMI 35.0-39.9) with comorbidity (HCC)        4. Acute gastric ulcer without hemorrhage or perforation        5. ELSY on CPAP               Plan:       Persistent symptomatic atrial fibrillation despite treatment with sotalol.      If we are going to pursue rhythm control due to symptom I think the next step should be ablation.     I discussed ablation for further rhythm control vs switch to alternative antiarrhythmic therapy with the patient.  He is on a good dose of sotalol, and not maintaining sinus rhythm.  I doubt that tikosyn or amiodarone would do better.  Ablation probably gives us a 50% chance for sinus rhythm.     I discussed the risk of ablation with the patient including the risk of stroke, cardiac tamponade, phrenic and esophageal injury.     He is going to work of weight loss, and treatment of his sleep apnea.     After discussion we have decided to proceed with ablation.  He will need to remain on xarelto.     He has not had any further GI bleeding since prior episode.     As always, it has been a pleasure to participate in your patient's care.    A total of 62 minutes was spent on this visit reviewing previous notes, reviewing test including lab and echocardiogram  counseling the patient on atrial fibrillation and weight loss, discussing  procedure and risk with the patient and documenting the findings in the note.    Sincerely,         Robert Busby MD

## 2022-11-01 NOTE — H&P (VIEW-ONLY)
Date of Office Visit: 2022  Encounter Provider: Robert Busby MD  Place of Service: [facility] CARDIOLOGY  Patient Name: Hakeem Onofre Jr.  :1950    Chief Complaint   Patient presents with   • Atrial Fibrillation     New Patient Consult / ELSY HTN    :     HPI: Hakeem Onofre Jr. is a 72 y.o. male who presents today for persistent atrial fibrillation    His symptoms of atrial fibrillation are a feeling of anxiety    He had a prior flutter ablation in . He was probably started on sotalol around this time.      He has been cardioverted twice, once in , and then one in     He feels that he has been in atrial fibrillation since probably  of this year.     His has a prior hospitalization for gastric bleeding, which was attributed to taking too much advil    He has been back on rivaroxaban 20 mg daily, with no issue for several years.           Past Medical History:   Diagnosis Date   • Abnormal ECG     A-Fib   • Arrhythmia    • Atrial fibrillation (HCC)    • Colon polyp    • Gastric ulcer    • Gastric ulcer    • GERD (gastroesophageal reflux disease)    • History of 2019 novel coronavirus disease (COVID-19) 10/13/2022   • History of transfusion 2019    gastric ulcer   • Hypertension    • Sleep apnea     uses cpap       Past Surgical History:   Procedure Laterality Date   • ABLATION OF DYSRHYTHMIC FOCUS      for treatment of Afib/flutter   • CARDIAC CATHETERIZATION     • CATARACT EXTRACTION     • COLONOSCOPY N/A 2019    Procedure: COLONOSCOPY TO CECUM AND TI WITH COLD SNARE POLYPECTOMIES;  Surgeon: Duc Medley MD;  Location: [facility] ENDOSCOPY;  Service: Gastroenterology   • ENDOSCOPY     • ENDOSCOPY N/A 2019    Procedure: ESOPHAGOGASTRODUODENOSCOPY WITH COLD BIOPSIES;  Surgeon: Duc Medley MD;  Location: [facility] ENDOSCOPY;  Service: Gastroenterology       Social History     Socioeconomic History   • Marital  "status:    Tobacco Use   • Smoking status: Never   • Smokeless tobacco: Never   • Tobacco comments:     caffiene occ   Vaping Use   • Vaping Use: Never used   Substance and Sexual Activity   • Alcohol use: Yes     Alcohol/week: 3.0 standard drinks     Types: 3 Cans of beer per week     Comment: occ   • Drug use: No   • Sexual activity: Yes     Partners: Female       Family History   Problem Relation Age of Onset   • Heart attack Father    • Hypertension Father    • Heart disease Father        Review of Systems   Constitutional: Negative.   Cardiovascular: Negative.    Respiratory: Negative.    Gastrointestinal: Negative.        No Known Allergies      Current Outpatient Medications:   •  Ferrous Sulfate (IRON PO), Take 1 tablet by mouth Daily., Disp: , Rfl:   •  metoprolol succinate XL (TOPROL-XL) 100 MG 24 hr tablet, Take 1 tablet by mouth Daily., Disp: 90 tablet, Rfl: 3  •  pantoprazole (PROTONIX) 40 MG EC tablet, TAKE 1 TABLET BY MOUTH ONCE DAILY . APPOINTMENT REQUIRED FOR FUTURE REFILLS, Disp: 90 tablet, Rfl: 0  •  sotalol (BETAPACE) 160 MG tablet, Take 1 tablet by mouth 2 (Two) Times a Day., Disp: 180 tablet, Rfl: 0  •  Xarelto 20 MG tablet, Take 1 tablet by mouth once daily, Disp: 90 tablet, Rfl: 0      Objective:     Vitals:    11/01/22 1249   BP: 138/88   BP Location: Right arm   Pulse: 103   Weight: 127 kg (280 lb)   Height: 185.4 cm (73\")     Body mass index is 36.94 kg/m².    PHYSICAL EXAM:    Vitals and nursing note reviewed.   Constitutional:       Appearance: Healthy appearance. Obese.   HENT:      Head: Normocephalic and atraumatic.   Pulmonary:      Effort: Pulmonary effort is normal.      Breath sounds: Normal breath sounds.   Chest:   Breasts:     Right: Normal.      Left: Normal.   Cardiovascular:      Normal rate. Irregular rhythm.   Edema:     Peripheral edema absent.   Neurological:      General: No focal deficit present.      Mental Status: Alert and oriented to person, place, and time. "   Psychiatric:         Attention and Perception: Attention and perception normal.         Mood and Affect: Mood and affect normal.             ECG 12 Lead    Date/Time: 11/1/2022 5:34 PM  Performed by: Robert Busby MD  Authorized by: Robert Busby MD   Comparison: compared with previous ECG from 10/13/2022  Similar to previous ECG  Rhythm: atrial fibrillation        I reviewed his echocardiogram images.  He has significant left atrial enlargement.       Assessment:       Diagnosis Plan   1. Atrial fibrillation, persistent (HCC)  Case Request EP Lab: Ablation atrial fibrillation      2. Persistent atrial fibrillation (HCC)        3. Severe obesity (BMI 35.0-39.9) with comorbidity (HCC)        4. Acute gastric ulcer without hemorrhage or perforation        5. ELSY on CPAP               Plan:       Persistent symptomatic atrial fibrillation despite treatment with sotalol.      If we are going to pursue rhythm control due to symptom I think the next step should be ablation.     I discussed ablation for further rhythm control vs switch to alternative antiarrhythmic therapy with the patient.  He is on a good dose of sotalol, and not maintaining sinus rhythm.  I doubt that tikosyn or amiodarone would do better.  Ablation probably gives us a 50% chance for sinus rhythm.     I discussed the risk of ablation with the patient including the risk of stroke, cardiac tamponade, phrenic and esophageal injury.     He is going to work of weight loss, and treatment of his sleep apnea.     After discussion we have decided to proceed with ablation.  He will need to remain on xarelto.     He has not had any further GI bleeding since prior episode.     As always, it has been a pleasure to participate in your patient's care.    A total of 62 minutes was spent on this visit reviewing previous notes, reviewing test including lab and echocardiogram  counseling the patient on atrial fibrillation and weight loss, discussing  procedure and risk with the patient and documenting the findings in the note.    Sincerely,         Robert Busby MD

## 2022-11-15 ENCOUNTER — LAB (OUTPATIENT)
Dept: LAB | Facility: HOSPITAL | Age: 72
End: 2022-11-15

## 2022-11-15 DIAGNOSIS — Z13.6 SCREENING FOR ISCHEMIC HEART DISEASE: ICD-10-CM

## 2022-11-15 DIAGNOSIS — Z01.810 PRE-OPERATIVE CARDIOVASCULAR EXAMINATION: ICD-10-CM

## 2022-11-15 LAB
ANION GAP SERPL CALCULATED.3IONS-SCNC: 9 MMOL/L (ref 5–15)
BASOPHILS # BLD AUTO: 0.04 10*3/MM3 (ref 0–0.2)
BASOPHILS NFR BLD AUTO: 0.4 % (ref 0–1.5)
BUN SERPL-MCNC: 12 MG/DL (ref 8–23)
BUN/CREAT SERPL: 13.6 (ref 7–25)
CALCIUM SPEC-SCNC: 9.3 MG/DL (ref 8.6–10.5)
CHLORIDE SERPL-SCNC: 99 MMOL/L (ref 98–107)
CO2 SERPL-SCNC: 28 MMOL/L (ref 22–29)
CREAT SERPL-MCNC: 0.88 MG/DL (ref 0.76–1.27)
DEPRECATED RDW RBC AUTO: 42.6 FL (ref 37–54)
EGFRCR SERPLBLD CKD-EPI 2021: 91.9 ML/MIN/1.73
EOSINOPHIL # BLD AUTO: 0.38 10*3/MM3 (ref 0–0.4)
EOSINOPHIL NFR BLD AUTO: 3.4 % (ref 0.3–6.2)
ERYTHROCYTE [DISTWIDTH] IN BLOOD BY AUTOMATED COUNT: 12.6 % (ref 12.3–15.4)
GLUCOSE SERPL-MCNC: 110 MG/DL (ref 65–99)
HCT VFR BLD AUTO: 49.8 % (ref 37.5–51)
HGB BLD-MCNC: 16.7 G/DL (ref 13–17.7)
IMM GRANULOCYTES # BLD AUTO: 0.04 10*3/MM3 (ref 0–0.05)
IMM GRANULOCYTES NFR BLD AUTO: 0.4 % (ref 0–0.5)
LYMPHOCYTES # BLD AUTO: 2.52 10*3/MM3 (ref 0.7–3.1)
LYMPHOCYTES NFR BLD AUTO: 22.2 % (ref 19.6–45.3)
MCH RBC QN AUTO: 31 PG (ref 26.6–33)
MCHC RBC AUTO-ENTMCNC: 33.5 G/DL (ref 31.5–35.7)
MCV RBC AUTO: 92.6 FL (ref 79–97)
MONOCYTES # BLD AUTO: 1.1 10*3/MM3 (ref 0.1–0.9)
MONOCYTES NFR BLD AUTO: 9.7 % (ref 5–12)
NEUTROPHILS NFR BLD AUTO: 63.9 % (ref 42.7–76)
NEUTROPHILS NFR BLD AUTO: 7.25 10*3/MM3 (ref 1.7–7)
NRBC BLD AUTO-RTO: 0 /100 WBC (ref 0–0.2)
PLATELET # BLD AUTO: 251 10*3/MM3 (ref 140–450)
PMV BLD AUTO: 11.2 FL (ref 6–12)
POTASSIUM SERPL-SCNC: 4.5 MMOL/L (ref 3.5–5.2)
RBC # BLD AUTO: 5.38 10*6/MM3 (ref 4.14–5.8)
SODIUM SERPL-SCNC: 136 MMOL/L (ref 136–145)
WBC NRBC COR # BLD: 11.33 10*3/MM3 (ref 3.4–10.8)

## 2022-11-15 PROCEDURE — 80048 BASIC METABOLIC PNL TOTAL CA: CPT

## 2022-11-15 PROCEDURE — 85025 COMPLETE CBC W/AUTO DIFF WBC: CPT

## 2022-11-15 PROCEDURE — 36415 COLL VENOUS BLD VENIPUNCTURE: CPT

## 2022-11-21 ENCOUNTER — HOSPITAL ENCOUNTER (OUTPATIENT)
Facility: HOSPITAL | Age: 72
Setting detail: HOSPITAL OUTPATIENT SURGERY
Discharge: HOME | End: 2022-11-21
Attending: INTERNAL MEDICINE | Admitting: INTERNAL MEDICINE

## 2022-11-21 ENCOUNTER — ANESTHESIA (OUTPATIENT)
Dept: CARDIOLOGY | Facility: HOSPITAL | Age: 72
End: 2022-11-21

## 2022-11-21 ENCOUNTER — ANESTHESIA EVENT (OUTPATIENT)
Dept: CARDIOLOGY | Facility: HOSPITAL | Age: 72
End: 2022-11-21

## 2022-11-21 VITALS
RESPIRATION RATE: 15 BRPM | HEART RATE: 56 BPM | BODY MASS INDEX: 37.11 KG/M2 | OXYGEN SATURATION: 99 % | WEIGHT: 280 LBS | HEIGHT: 73 IN | DIASTOLIC BLOOD PRESSURE: 77 MMHG | TEMPERATURE: 98.2 F | SYSTOLIC BLOOD PRESSURE: 123 MMHG

## 2022-11-21 DIAGNOSIS — I48.19 ATRIAL FIBRILLATION, PERSISTENT: ICD-10-CM

## 2022-11-21 LAB
ACT BLD: 312 SECONDS (ref 82–152)
ACT BLD: 318 SECONDS (ref 82–152)
ACT BLD: 341 SECONDS (ref 82–152)
ACT BLD: 347 SECONDS (ref 82–152)
QT INTERVAL: 332 MS
QT INTERVAL: 467 MS

## 2022-11-21 PROCEDURE — 25010000002 PHENYLEPHRINE 10 MG/ML SOLUTION 5 ML VIAL: Performed by: ANESTHESIOLOGY

## 2022-11-21 PROCEDURE — C1732 CATH, EP, DIAG/ABL, 3D/VECT: HCPCS | Performed by: INTERNAL MEDICINE

## 2022-11-21 PROCEDURE — 85347 COAGULATION TIME ACTIVATED: CPT

## 2022-11-21 PROCEDURE — 25010000002 PROTAMINE SULFATE PER 10 MG: Performed by: INTERNAL MEDICINE

## 2022-11-21 PROCEDURE — 93656 COMPRE EP EVAL ABLTJ ATR FIB: CPT | Performed by: INTERNAL MEDICINE

## 2022-11-21 PROCEDURE — 93010 ELECTROCARDIOGRAM REPORT: CPT | Performed by: INTERNAL MEDICINE

## 2022-11-21 PROCEDURE — 25010000002 HEPARIN (PORCINE) PER 1000 UNITS: Performed by: INTERNAL MEDICINE

## 2022-11-21 PROCEDURE — C1730 CATH, EP, 19 OR FEW ELECT: HCPCS | Performed by: INTERNAL MEDICINE

## 2022-11-21 PROCEDURE — C1759 CATH, INTRA ECHOCARDIOGRAPHY: HCPCS | Performed by: INTERNAL MEDICINE

## 2022-11-21 PROCEDURE — C1894 INTRO/SHEATH, NON-LASER: HCPCS | Performed by: INTERNAL MEDICINE

## 2022-11-21 PROCEDURE — 93005 ELECTROCARDIOGRAM TRACING: CPT | Performed by: INTERNAL MEDICINE

## 2022-11-21 PROCEDURE — 25010000002 MIDAZOLAM PER 1 MG: Performed by: ANESTHESIOLOGY

## 2022-11-21 PROCEDURE — 25010000002 PROPOFOL 10 MG/ML EMULSION: Performed by: ANESTHESIOLOGY

## 2022-11-21 PROCEDURE — C1893 INTRO/SHEATH, FIXED,NON-PEEL: HCPCS | Performed by: INTERNAL MEDICINE

## 2022-11-21 PROCEDURE — 25010000002 PHENYLEPHRINE 10 MG/ML SOLUTION: Performed by: ANESTHESIOLOGY

## 2022-11-21 PROCEDURE — C1766 INTRO/SHEATH,STRBLE,NON-PEEL: HCPCS | Performed by: INTERNAL MEDICINE

## 2022-11-21 RX ORDER — SODIUM CHLORIDE 9 MG/ML
40 INJECTION, SOLUTION INTRAVENOUS AS NEEDED
Status: DISCONTINUED | OUTPATIENT
Start: 2022-11-21 | End: 2022-11-21 | Stop reason: HOSPADM

## 2022-11-21 RX ORDER — PROPOFOL 10 MG/ML
VIAL (ML) INTRAVENOUS AS NEEDED
Status: DISCONTINUED | OUTPATIENT
Start: 2022-11-21 | End: 2022-11-21 | Stop reason: SURG

## 2022-11-21 RX ORDER — HYDROMORPHONE HYDROCHLORIDE 1 MG/ML
0.5 INJECTION, SOLUTION INTRAMUSCULAR; INTRAVENOUS; SUBCUTANEOUS
Status: DISCONTINUED | OUTPATIENT
Start: 2022-11-21 | End: 2022-11-21 | Stop reason: HOSPADM

## 2022-11-21 RX ORDER — LABETALOL HYDROCHLORIDE 5 MG/ML
5 INJECTION, SOLUTION INTRAVENOUS
Status: DISCONTINUED | OUTPATIENT
Start: 2022-11-21 | End: 2022-11-21 | Stop reason: HOSPADM

## 2022-11-21 RX ORDER — LIDOCAINE HYDROCHLORIDE 10 MG/ML
0.5 INJECTION, SOLUTION EPIDURAL; INFILTRATION; INTRACAUDAL; PERINEURAL ONCE AS NEEDED
Status: DISCONTINUED | OUTPATIENT
Start: 2022-11-21 | End: 2022-11-21 | Stop reason: HOSPADM

## 2022-11-21 RX ORDER — FLUMAZENIL 0.1 MG/ML
0.2 INJECTION INTRAVENOUS AS NEEDED
Status: DISCONTINUED | OUTPATIENT
Start: 2022-11-21 | End: 2022-11-21 | Stop reason: HOSPADM

## 2022-11-21 RX ORDER — OXYCODONE AND ACETAMINOPHEN 7.5; 325 MG/1; MG/1
1 TABLET ORAL EVERY 4 HOURS PRN
Status: DISCONTINUED | OUTPATIENT
Start: 2022-11-21 | End: 2022-11-21 | Stop reason: HOSPADM

## 2022-11-21 RX ORDER — ROCURONIUM BROMIDE 10 MG/ML
INJECTION, SOLUTION INTRAVENOUS AS NEEDED
Status: DISCONTINUED | OUTPATIENT
Start: 2022-11-21 | End: 2022-11-21 | Stop reason: SURG

## 2022-11-21 RX ORDER — ONDANSETRON 2 MG/ML
4 INJECTION INTRAMUSCULAR; INTRAVENOUS ONCE AS NEEDED
Status: DISCONTINUED | OUTPATIENT
Start: 2022-11-21 | End: 2022-11-21 | Stop reason: HOSPADM

## 2022-11-21 RX ORDER — SODIUM CHLORIDE 0.9 % (FLUSH) 0.9 %
3-10 SYRINGE (ML) INJECTION AS NEEDED
Status: DISCONTINUED | OUTPATIENT
Start: 2022-11-21 | End: 2022-11-21 | Stop reason: HOSPADM

## 2022-11-21 RX ORDER — PROTAMINE SULFATE 10 MG/ML
INJECTION, SOLUTION INTRAVENOUS
Status: DISCONTINUED | OUTPATIENT
Start: 2022-11-21 | End: 2022-11-21 | Stop reason: HOSPADM

## 2022-11-21 RX ORDER — MIDAZOLAM HYDROCHLORIDE 1 MG/ML
0.5 INJECTION INTRAMUSCULAR; INTRAVENOUS
Status: DISCONTINUED | OUTPATIENT
Start: 2022-11-21 | End: 2022-11-21 | Stop reason: HOSPADM

## 2022-11-21 RX ORDER — HYDROCODONE BITARTRATE AND ACETAMINOPHEN 7.5; 325 MG/1; MG/1
1 TABLET ORAL ONCE AS NEEDED
Status: DISCONTINUED | OUTPATIENT
Start: 2022-11-21 | End: 2022-11-21 | Stop reason: HOSPADM

## 2022-11-21 RX ORDER — ONDANSETRON 2 MG/ML
4 INJECTION INTRAMUSCULAR; INTRAVENOUS EVERY 6 HOURS PRN
Status: DISCONTINUED | OUTPATIENT
Start: 2022-11-21 | End: 2022-11-21 | Stop reason: HOSPADM

## 2022-11-21 RX ORDER — SODIUM CHLORIDE 9 MG/ML
75 INJECTION, SOLUTION INTRAVENOUS CONTINUOUS
Status: DISCONTINUED | OUTPATIENT
Start: 2022-11-21 | End: 2022-11-21 | Stop reason: HOSPADM

## 2022-11-21 RX ORDER — SODIUM CHLORIDE 0.9 % (FLUSH) 0.9 %
10 SYRINGE (ML) INJECTION AS NEEDED
Status: DISCONTINUED | OUTPATIENT
Start: 2022-11-21 | End: 2022-11-21 | Stop reason: HOSPADM

## 2022-11-21 RX ORDER — FAMOTIDINE 10 MG/ML
20 INJECTION, SOLUTION INTRAVENOUS ONCE
Status: COMPLETED | OUTPATIENT
Start: 2022-11-21 | End: 2022-11-21

## 2022-11-21 RX ORDER — PROMETHAZINE HYDROCHLORIDE 12.5 MG/1
25 TABLET ORAL ONCE AS NEEDED
Status: DISCONTINUED | OUTPATIENT
Start: 2022-11-21 | End: 2022-11-21 | Stop reason: HOSPADM

## 2022-11-21 RX ORDER — DIPHENHYDRAMINE HYDROCHLORIDE 50 MG/ML
12.5 INJECTION INTRAMUSCULAR; INTRAVENOUS
Status: DISCONTINUED | OUTPATIENT
Start: 2022-11-21 | End: 2022-11-21 | Stop reason: HOSPADM

## 2022-11-21 RX ORDER — NALOXONE HCL 0.4 MG/ML
0.4 VIAL (ML) INJECTION
Status: DISCONTINUED | OUTPATIENT
Start: 2022-11-21 | End: 2022-11-21 | Stop reason: HOSPADM

## 2022-11-21 RX ORDER — PROMETHAZINE HYDROCHLORIDE 25 MG/1
25 SUPPOSITORY RECTAL ONCE AS NEEDED
Status: DISCONTINUED | OUTPATIENT
Start: 2022-11-21 | End: 2022-11-21 | Stop reason: HOSPADM

## 2022-11-21 RX ORDER — SODIUM CHLORIDE 0.9 % (FLUSH) 0.9 %
3 SYRINGE (ML) INJECTION EVERY 12 HOURS SCHEDULED
Status: DISCONTINUED | OUTPATIENT
Start: 2022-11-21 | End: 2022-11-21 | Stop reason: HOSPADM

## 2022-11-21 RX ORDER — EPHEDRINE SULFATE 50 MG/ML
5 INJECTION, SOLUTION INTRAVENOUS ONCE AS NEEDED
Status: DISCONTINUED | OUTPATIENT
Start: 2022-11-21 | End: 2022-11-21 | Stop reason: HOSPADM

## 2022-11-21 RX ORDER — HYDRALAZINE HYDROCHLORIDE 20 MG/ML
5 INJECTION INTRAMUSCULAR; INTRAVENOUS
Status: DISCONTINUED | OUTPATIENT
Start: 2022-11-21 | End: 2022-11-21 | Stop reason: HOSPADM

## 2022-11-21 RX ORDER — NALOXONE HCL 0.4 MG/ML
0.2 VIAL (ML) INJECTION AS NEEDED
Status: DISCONTINUED | OUTPATIENT
Start: 2022-11-21 | End: 2022-11-21 | Stop reason: HOSPADM

## 2022-11-21 RX ORDER — SODIUM CHLORIDE 0.9 % (FLUSH) 0.9 %
10 SYRINGE (ML) INJECTION EVERY 12 HOURS SCHEDULED
Status: DISCONTINUED | OUTPATIENT
Start: 2022-11-21 | End: 2022-11-21 | Stop reason: HOSPADM

## 2022-11-21 RX ORDER — HEPARIN SODIUM 1000 [USP'U]/ML
INJECTION, SOLUTION INTRAVENOUS; SUBCUTANEOUS
Status: DISCONTINUED | OUTPATIENT
Start: 2022-11-21 | End: 2022-11-21 | Stop reason: HOSPADM

## 2022-11-21 RX ORDER — DIPHENHYDRAMINE HCL 25 MG
25 CAPSULE ORAL
Status: DISCONTINUED | OUTPATIENT
Start: 2022-11-21 | End: 2022-11-21 | Stop reason: HOSPADM

## 2022-11-21 RX ORDER — SODIUM CHLORIDE, SODIUM LACTATE, POTASSIUM CHLORIDE, CALCIUM CHLORIDE 600; 310; 30; 20 MG/100ML; MG/100ML; MG/100ML; MG/100ML
9 INJECTION, SOLUTION INTRAVENOUS CONTINUOUS
Status: DISCONTINUED | OUTPATIENT
Start: 2022-11-21 | End: 2022-11-21 | Stop reason: HOSPADM

## 2022-11-21 RX ORDER — FENTANYL CITRATE 50 UG/ML
50 INJECTION, SOLUTION INTRAMUSCULAR; INTRAVENOUS
Status: DISCONTINUED | OUTPATIENT
Start: 2022-11-21 | End: 2022-11-21 | Stop reason: HOSPADM

## 2022-11-21 RX ORDER — PHENYLEPHRINE HYDROCHLORIDE 10 MG/ML
INJECTION INTRAVENOUS AS NEEDED
Status: DISCONTINUED | OUTPATIENT
Start: 2022-11-21 | End: 2022-11-21 | Stop reason: SURG

## 2022-11-21 RX ADMIN — ROCURONIUM BROMIDE 50 MG: 50 INJECTION INTRAVENOUS at 13:42

## 2022-11-21 RX ADMIN — FAMOTIDINE 20 MG: 10 INJECTION INTRAVENOUS at 12:57

## 2022-11-21 RX ADMIN — PHENYLEPHRINE HYDROCHLORIDE 0.5 MCG/KG/MIN: 10 INJECTION INTRAVENOUS at 13:59

## 2022-11-21 RX ADMIN — PROPOFOL 200 MG: 10 INJECTION, EMULSION INTRAVENOUS at 13:42

## 2022-11-21 RX ADMIN — PHENYLEPHRINE HYDROCHLORIDE 200 MCG: 10 INJECTION, SOLUTION INTRAVENOUS at 14:01

## 2022-11-21 RX ADMIN — MIDAZOLAM 0.5 MG: 1 INJECTION INTRAMUSCULAR; INTRAVENOUS at 12:58

## 2022-11-21 RX ADMIN — SUGAMMADEX 400 MG: 100 INJECTION, SOLUTION INTRAVENOUS at 16:09

## 2022-11-21 RX ADMIN — SODIUM CHLORIDE 75 ML/HR: 9 INJECTION, SOLUTION INTRAVENOUS at 11:32

## 2022-11-21 RX ADMIN — ROCURONIUM BROMIDE 30 MG: 50 INJECTION INTRAVENOUS at 15:08

## 2022-11-21 RX ADMIN — SODIUM CHLORIDE: 9 INJECTION, SOLUTION INTRAVENOUS at 15:26

## 2022-11-21 NOTE — ANESTHESIA PREPROCEDURE EVALUATION
Anesthesia Evaluation     Patient summary reviewed and Nursing notes reviewed                Airway   Mallampati: II  TM distance: >3 FB  Neck ROM: full  Possible difficult intubation and Large neck circumference  Dental - normal exam     Pulmonary - normal exam   (+) sleep apnea,   Cardiovascular     ECG reviewed  Rhythm: irregular  Rate: normal    (+) hypertension 2 medications or greater, dysrhythmias Atrial Fib,     ROS comment: Hx aflutter ablation at [facility] ago/in afib now/EF 60% by ECHO 10/21  PE comment: Afib    Neuro/Psych  GI/Hepatic/Renal/Endo    (+) morbid obesity, GERD, PUD,      Musculoskeletal     Abdominal   (+) obese,    Substance History      OB/GYN          Other                          Anesthesia Plan    ASA 3     general     (I discussed with the patient the relevant risks of general anesthesia including, but not limited to, nausea, vomiting, disorientation, post-op delirium, nerve injury, oral/dental injury, awareness, stroke, and death.  I also reviewed any clinically relevant lab and imaging results.)  intravenous induction     Anesthetic plan, risks, benefits, and alternatives have been provided, discussed and informed consent has been obtained with: patient.    Plan discussed with CRNA.

## 2022-11-21 NOTE — ANESTHESIA PROCEDURE NOTES
Airway  Urgency: elective    Date/Time: 11/21/2022 1:45 PM  Difficult airway    General Information and Staff    Patient location during procedure: OR  Anesthesiologist: Marco Francois MD    Indications and Patient Condition  Indications for airway management: airway protection    Preoxygenated: yes  Mask difficulty assessment: 3 - difficult mask (inadequate, unstable or two providers) +/- NMBA    Final Airway Details  Final airway type: endotracheal airway      Successful airway: ETT  Cuffed: yes   Successful intubation technique: video laryngoscopy  Facilitating devices/methods: Bougie  Endotracheal tube insertion site: oral  Blade: CMAC  Blade size: D  ETT size (mm): 8.0  Placement verified by: chest auscultation and capnometry   Measured from: teeth  ETT/EBT  to teeth (cm): 23  Assessment: lips, teeth, and gum same as pre-op and atraumatic intubation    Additional Comments  Large floppy epiglottis sen on cmac

## 2022-11-21 NOTE — DISCHARGE INSTRUCTIONS
[facility]  Cardiology  [address] [place] [address]  [phone]    Coronary Ablation After Care    Refer to this sheet in the next few weeks. These instructions provide you with information on caring for yourself after your procedure. Your health care provider may also give you more specific instructions. Your treatment has been planned according to current medical practices, but problems sometimes occur. Call your health care provider if you have any problems or questions after your procedure.      What to Expect After the Procedure:  After your procedure, it is typical to have the following sensations:  Minor discomfort or tenderness and a small bump at the catheter insertion site(s). The bump(s) should usually decrease in size and tenderness within 1 to 2 weeks.  Any bruising will usually fade within 2 to 4 weeks.  Home Care Instructions:  Do not apply powder or lotion to the site.  Do not take baths, swim, or use a hot tub until your health care provider approves and the site is completely healed.  Do not bend, squat, or lift anything over 20 lb (9 kg) or as directed by your health care provider. However, we recommend lifting nothing heavier than a gallon of milk.    You may shower 24 hours after the procedure. Remove the bandage (dressing) and gently wash the site with plain soap and water. Gently pat the site dry. You may apply a band aid daily for 2 days if desired.    Inspect the site at least twice daily.  Increase your fluid intake for the next 2 days.    Limit your activity for the first 48 hours.   Avoid strenuous activity for 1 week or as advised by your physician.    Follow instructions about when you can drive or return to work as directed by your physician.    Hold direct pressure over the site when you cough, sneeze, laugh or change positions.  Do this for the next 2 days.    Call Your Doctor If:  You have drainage (other than a small amount of blood on the dressing).  You  have chills or a fever > 101.  You have redness, warmth, swelling (larger than a walnut), or pain at the insertion   You develop palpitations, chest pain or shortness of breath, feel faint, or pass out.  You develop pain, discoloration, coldness, numbness, tingling, or severe bruising in the leg that held the catheter.  You develop bleeding from any other place, such as the bowels.  You have heavy bleeding from the site.  If this happens, hold pressure on the site and call 911.        Make Sure You:  Understand these instructions.  Will watch your condition.  Will get help right away if you are not doing well or get worse.      d/w wife Nydia over phone about guarded situation, all ques answered    Crystal Jacome MD  Division of Hospital Medicine  Pager: [phone]  Office: [phone]

## 2022-11-22 NOTE — ANESTHESIA POSTPROCEDURE EVALUATION
"Patient: Hakeem Onofre Jr.    Procedure Summary     Date: 11/21/22 Room / Location: [facility] /  [facility]    Anesthesia Start: 1332 Anesthesia Stop: 1638    Procedures:       Ablation atrial fibrillation      3D MAPPING CARTO EP Diagnosis:       Atrial fibrillation, persistent (HCC)      (atrial fibrillation)    Providers: Robert Busby MD Provider: Marco Francois MD    Anesthesia Type: general ASA Status: 3          Anesthesia Type: general    Vitals  Vitals Value Taken Time   /77 11/21/22 1911   Temp     Pulse 70 11/21/22 1922   Resp 15 11/21/22 1910   SpO2 100 % 11/21/22 1922   Vitals shown include unvalidated device data.        Post Anesthesia Care and Evaluation    Patient participation: complete - patient cannot participate  Anesthetic complications: No anesthetic complications    Cardiovascular status: acceptable  Respiratory status: acceptable  Hydration status: acceptable    Comments: Patient was discharged prior to being evaluated by Anesthesia...per chart review, no anesthetic complications were noted.  NOT MEDICALLY DIRECTED  /77   Pulse 56   Temp 36.8 °C (98.2 °F) (Temporal)   Resp 15   Ht 185.4 cm (73\")   Wt 127 kg (280 lb)   SpO2 99%   BMI 36.94 kg/m²         "

## 2022-11-27 DIAGNOSIS — I48.0 PAROXYSMAL ATRIAL FIBRILLATION: Chronic | ICD-10-CM

## 2022-11-27 DIAGNOSIS — I10 ESSENTIAL HYPERTENSION: Chronic | ICD-10-CM

## 2023-01-01 DIAGNOSIS — I10 ESSENTIAL HYPERTENSION: Chronic | ICD-10-CM

## 2023-01-01 DIAGNOSIS — I48.0 PAROXYSMAL ATRIAL FIBRILLATION: Chronic | ICD-10-CM

## 2023-01-08 DIAGNOSIS — I48.0 PAROXYSMAL ATRIAL FIBRILLATION: Chronic | ICD-10-CM

## 2023-01-08 DIAGNOSIS — I10 ESSENTIAL HYPERTENSION: Chronic | ICD-10-CM

## 2023-01-09 RX ORDER — SOTALOL HYDROCHLORIDE 160 MG/1
TABLET ORAL
Qty: 180 TABLET | Refills: 0 | Status: SHIPPED | OUTPATIENT
Start: 2023-01-09

## 2023-01-24 DIAGNOSIS — I48.0 PAROXYSMAL ATRIAL FIBRILLATION: Chronic | ICD-10-CM

## 2023-01-24 DIAGNOSIS — I10 ESSENTIAL HYPERTENSION: Chronic | ICD-10-CM

## 2023-01-24 RX ORDER — RIVAROXABAN 20 MG/1
TABLET, FILM COATED ORAL
Qty: 30 TABLET | Refills: 0 | Status: SHIPPED | OUTPATIENT
Start: 2023-01-24 | End: 2023-02-27

## 2023-02-26 DIAGNOSIS — I10 ESSENTIAL HYPERTENSION: Chronic | ICD-10-CM

## 2023-02-26 DIAGNOSIS — I48.0 PAROXYSMAL ATRIAL FIBRILLATION: Chronic | ICD-10-CM

## 2023-02-27 RX ORDER — RIVAROXABAN 20 MG/1
TABLET, FILM COATED ORAL
Qty: 30 TABLET | Refills: 0 | Status: SHIPPED | OUTPATIENT
Start: 2023-02-27 | End: 2023-03-27

## 2023-03-26 DIAGNOSIS — I48.0 PAROXYSMAL ATRIAL FIBRILLATION: Chronic | ICD-10-CM

## 2023-03-26 DIAGNOSIS — I10 ESSENTIAL HYPERTENSION: Chronic | ICD-10-CM

## 2023-03-27 RX ORDER — RIVAROXABAN 20 MG/1
TABLET, FILM COATED ORAL
Qty: 30 TABLET | Refills: 2 | Status: SHIPPED | OUTPATIENT
Start: 2023-03-27

## 2023-04-06 DIAGNOSIS — I48.0 PAROXYSMAL ATRIAL FIBRILLATION: Chronic | ICD-10-CM

## 2023-04-06 DIAGNOSIS — I10 ESSENTIAL HYPERTENSION: Chronic | ICD-10-CM

## 2023-04-10 RX ORDER — SOTALOL HYDROCHLORIDE 160 MG/1
TABLET ORAL
Qty: 180 TABLET | Refills: 0 | Status: SHIPPED | OUTPATIENT
Start: 2023-04-10

## 2023-07-20 PROBLEM — Z86.79 S/P ABLATION OF ATRIAL FIBRILLATION: Status: ACTIVE | Noted: 2023-07-20

## 2023-07-20 PROBLEM — Z98.890 S/P ABLATION OF ATRIAL FIBRILLATION: Status: ACTIVE | Noted: 2023-07-20

## 2023-08-09 DIAGNOSIS — I10 ESSENTIAL HYPERTENSION: Chronic | ICD-10-CM

## 2023-08-09 DIAGNOSIS — I48.0 PAROXYSMAL ATRIAL FIBRILLATION: Chronic | ICD-10-CM

## 2023-08-09 RX ORDER — RIVAROXABAN 20 MG/1
TABLET, FILM COATED ORAL
Qty: 30 TABLET | Refills: 0 | Status: SHIPPED | OUTPATIENT
Start: 2023-08-09

## 2023-09-08 DIAGNOSIS — I10 ESSENTIAL HYPERTENSION: Chronic | ICD-10-CM

## 2023-09-08 DIAGNOSIS — I48.0 PAROXYSMAL ATRIAL FIBRILLATION: Chronic | ICD-10-CM

## 2023-09-08 RX ORDER — RIVAROXABAN 20 MG/1
TABLET, FILM COATED ORAL
Qty: 30 TABLET | Refills: 5 | Status: SHIPPED | OUTPATIENT
Start: 2023-09-08

## 2023-09-08 NOTE — TELEPHONE ENCOUNTER
Last OV 7/20/23.  Next OV 10/19/23. Labs 11/15/22.  [facility]

## 2023-10-08 DIAGNOSIS — I48.0 PAROXYSMAL ATRIAL FIBRILLATION: Chronic | ICD-10-CM

## 2023-10-08 DIAGNOSIS — I10 ESSENTIAL HYPERTENSION: Chronic | ICD-10-CM

## 2023-10-10 RX ORDER — SOTALOL HYDROCHLORIDE 160 MG/1
TABLET ORAL
Qty: 180 TABLET | Refills: 0 | Status: SHIPPED | OUTPATIENT
Start: 2023-10-10

## 2023-10-30 ENCOUNTER — TELEPHONE (OUTPATIENT)
Dept: GASTROENTEROLOGY | Facility: CLINIC | Age: 73
End: 2023-10-30
Payer: MEDICARE

## 2023-10-30 NOTE — TELEPHONE ENCOUNTER
"    Caller: Hakeem Onofre Jr. \"Bill\"    Relationship to patient: Self    Best call back number: [phone]    Patient is needing: PATIENT STATING HE SHOULD HAVE HAD A REPEAT COLONOSCOPY TO SCHEDULE PER DR CALABRESE.  NOT SHOWING RECALL IN THE SYSTEM. PLEASE REACH OUT TO CONFIRM.  THANK YOU   "

## 2023-10-31 NOTE — TELEPHONE ENCOUNTER
Spoke with patient.  Last colonoscopy 08/13/2019, repeat in 3 years.    Explained about the Fast Track.  Once received back and review will call to schedule.    Emailed to patient.    [email or web address]

## 2023-11-08 ENCOUNTER — TELEPHONE (OUTPATIENT)
Dept: GASTROENTEROLOGY | Facility: CLINIC | Age: 73
End: 2023-11-08
Payer: MEDICARE

## 2023-11-08 DIAGNOSIS — Z86.010 PERSONAL HISTORY OF COLONIC POLYPS: Primary | ICD-10-CM

## 2023-11-08 NOTE — TELEPHONE ENCOUNTER
FAST TRACK - 3 YEAR RECALL 08/2022 - COLON  LAST EGD & COLONOSCOPY 08/13/2019  PERSONAL HISTORY POLYPS  NO FAMILY HISTORY CRC/P  SCHEDULE AT [place]

## 2024-01-09 DIAGNOSIS — I10 ESSENTIAL HYPERTENSION: Chronic | ICD-10-CM

## 2024-01-09 DIAGNOSIS — I48.0 PAROXYSMAL ATRIAL FIBRILLATION: Chronic | ICD-10-CM

## 2024-01-09 RX ORDER — SOTALOL HYDROCHLORIDE 160 MG/1
TABLET ORAL
Qty: 180 TABLET | Refills: 0 | Status: SHIPPED | OUTPATIENT
Start: 2024-01-09

## 2024-02-13 ENCOUNTER — ANESTHESIA EVENT (OUTPATIENT)
Dept: PERIOP | Facility: HOSPITAL | Age: 74
End: 2024-02-13
Payer: MEDICARE

## 2024-02-14 ENCOUNTER — HOSPITAL ENCOUNTER (OUTPATIENT)
Facility: HOSPITAL | Age: 74
Setting detail: HOSPITAL OUTPATIENT SURGERY
Discharge: HOME | End: 2024-02-14
Attending: INTERNAL MEDICINE | Admitting: INTERNAL MEDICINE
Payer: MEDICARE

## 2024-02-14 ENCOUNTER — ANESTHESIA (OUTPATIENT)
Dept: PERIOP | Facility: HOSPITAL | Age: 74
End: 2024-02-14
Payer: MEDICARE

## 2024-02-14 VITALS
HEIGHT: 73 IN | DIASTOLIC BLOOD PRESSURE: 77 MMHG | WEIGHT: 274.6 LBS | TEMPERATURE: 98.7 F | OXYGEN SATURATION: 100 % | RESPIRATION RATE: 12 BRPM | BODY MASS INDEX: 36.39 KG/M2 | SYSTOLIC BLOOD PRESSURE: 150 MMHG | HEART RATE: 52 BPM

## 2024-02-14 DIAGNOSIS — Z86.010 PERSONAL HISTORY OF COLONIC POLYPS: ICD-10-CM

## 2024-02-14 PROCEDURE — 25010000002 GLYCOPYRROLATE 0.2 MG/ML SOLUTION: Performed by: NURSE ANESTHETIST, CERTIFIED REGISTERED

## 2024-02-14 PROCEDURE — 25010000002 PROPOFOL 10 MG/ML EMULSION: Performed by: NURSE ANESTHETIST, CERTIFIED REGISTERED

## 2024-02-14 PROCEDURE — 88305 TISSUE EXAM BY PATHOLOGIST: CPT | Performed by: INTERNAL MEDICINE

## 2024-02-14 PROCEDURE — 25810000003 LACTATED RINGERS PER 1000 ML: Performed by: NURSE ANESTHETIST, CERTIFIED REGISTERED

## 2024-02-14 RX ORDER — EPHEDRINE SULFATE 50 MG/ML
INJECTION INTRAVENOUS AS NEEDED
Status: DISCONTINUED | OUTPATIENT
Start: 2024-02-14 | End: 2024-02-14 | Stop reason: SURG

## 2024-02-14 RX ORDER — GLYCOPYRROLATE 0.2 MG/ML
INJECTION INTRAMUSCULAR; INTRAVENOUS AS NEEDED
Status: DISCONTINUED | OUTPATIENT
Start: 2024-02-14 | End: 2024-02-14 | Stop reason: SURG

## 2024-02-14 RX ORDER — SODIUM CHLORIDE 9 MG/ML
40 INJECTION, SOLUTION INTRAVENOUS AS NEEDED
Status: DISCONTINUED | OUTPATIENT
Start: 2024-02-14 | End: 2024-02-14 | Stop reason: HOSPADM

## 2024-02-14 RX ORDER — SODIUM CHLORIDE, SODIUM LACTATE, POTASSIUM CHLORIDE, CALCIUM CHLORIDE 600; 310; 30; 20 MG/100ML; MG/100ML; MG/100ML; MG/100ML
9 INJECTION, SOLUTION INTRAVENOUS CONTINUOUS PRN
Status: DISCONTINUED | OUTPATIENT
Start: 2024-02-14 | End: 2024-02-14 | Stop reason: HOSPADM

## 2024-02-14 RX ORDER — PROPOFOL 10 MG/ML
VIAL (ML) INTRAVENOUS AS NEEDED
Status: DISCONTINUED | OUTPATIENT
Start: 2024-02-14 | End: 2024-02-14 | Stop reason: SURG

## 2024-02-14 RX ORDER — LIDOCAINE HYDROCHLORIDE 20 MG/ML
INJECTION, SOLUTION INFILTRATION; PERINEURAL AS NEEDED
Status: DISCONTINUED | OUTPATIENT
Start: 2024-02-14 | End: 2024-02-14 | Stop reason: SURG

## 2024-02-14 RX ORDER — ONDANSETRON 2 MG/ML
4 INJECTION INTRAMUSCULAR; INTRAVENOUS ONCE AS NEEDED
Status: DISCONTINUED | OUTPATIENT
Start: 2024-02-14 | End: 2024-02-14 | Stop reason: HOSPADM

## 2024-02-14 RX ORDER — SODIUM CHLORIDE, SODIUM LACTATE, POTASSIUM CHLORIDE, CALCIUM CHLORIDE 600; 310; 30; 20 MG/100ML; MG/100ML; MG/100ML; MG/100ML
100 INJECTION, SOLUTION INTRAVENOUS CONTINUOUS
Status: DISCONTINUED | OUTPATIENT
Start: 2024-02-14 | End: 2024-02-14 | Stop reason: HOSPADM

## 2024-02-14 RX ORDER — SODIUM CHLORIDE 0.9 % (FLUSH) 0.9 %
10 SYRINGE (ML) INJECTION AS NEEDED
Status: DISCONTINUED | OUTPATIENT
Start: 2024-02-14 | End: 2024-02-14 | Stop reason: HOSPADM

## 2024-02-14 RX ORDER — SODIUM CHLORIDE 0.9 % (FLUSH) 0.9 %
10 SYRINGE (ML) INJECTION EVERY 12 HOURS SCHEDULED
Status: DISCONTINUED | OUTPATIENT
Start: 2024-02-14 | End: 2024-02-14 | Stop reason: HOSPADM

## 2024-02-14 RX ORDER — MAGNESIUM HYDROXIDE 1200 MG/15ML
LIQUID ORAL AS NEEDED
Status: DISCONTINUED | OUTPATIENT
Start: 2024-02-14 | End: 2024-02-14 | Stop reason: HOSPADM

## 2024-02-14 RX ADMIN — PROPOFOL 20 MG: 10 INJECTION, EMULSION INTRAVENOUS at 15:25

## 2024-02-14 RX ADMIN — PROPOFOL 20 MG: 10 INJECTION, EMULSION INTRAVENOUS at 15:29

## 2024-02-14 RX ADMIN — PROPOFOL 20 MG: 10 INJECTION, EMULSION INTRAVENOUS at 15:36

## 2024-02-14 RX ADMIN — PROPOFOL 50 MG: 10 INJECTION, EMULSION INTRAVENOUS at 15:15

## 2024-02-14 RX ADMIN — PROPOFOL 50 MG: 10 INJECTION, EMULSION INTRAVENOUS at 15:11

## 2024-02-14 RX ADMIN — EPHEDRINE SULFATE 20 MG: 50 INJECTION INTRAVENOUS at 15:24

## 2024-02-14 RX ADMIN — EPHEDRINE SULFATE 10 MG: 50 INJECTION INTRAVENOUS at 15:36

## 2024-02-14 RX ADMIN — PROPOFOL 20 MG: 10 INJECTION, EMULSION INTRAVENOUS at 15:31

## 2024-02-14 RX ADMIN — PROPOFOL 20 MG: 10 INJECTION, EMULSION INTRAVENOUS at 15:34

## 2024-02-14 RX ADMIN — PROPOFOL 20 MG: 10 INJECTION, EMULSION INTRAVENOUS at 15:39

## 2024-02-14 RX ADMIN — PROPOFOL 20 MG: 10 INJECTION, EMULSION INTRAVENOUS at 15:27

## 2024-02-14 RX ADMIN — PROPOFOL 100 MG: 10 INJECTION, EMULSION INTRAVENOUS at 15:09

## 2024-02-14 RX ADMIN — PROPOFOL 20 MG: 10 INJECTION, EMULSION INTRAVENOUS at 15:22

## 2024-02-14 RX ADMIN — SODIUM CHLORIDE, POTASSIUM CHLORIDE, SODIUM LACTATE AND CALCIUM CHLORIDE 9 ML/HR: 600; 310; 30; 20 INJECTION, SOLUTION INTRAVENOUS at 13:41

## 2024-02-14 RX ADMIN — GLYCOPYRROLATE 0.4 MG: 0.2 INJECTION, SOLUTION INTRAMUSCULAR; INTRAVENOUS at 15:12

## 2024-02-14 RX ADMIN — PROPOFOL 20 MG: 10 INJECTION, EMULSION INTRAVENOUS at 15:20

## 2024-02-14 RX ADMIN — LIDOCAINE HYDROCHLORIDE 100 MG: 20 INJECTION, SOLUTION INFILTRATION; PERINEURAL at 15:09

## 2024-02-16 LAB
LAB AP CASE REPORT: NORMAL
LAB AP DIAGNOSIS COMMENT: NORMAL
PATH REPORT.FINAL DX SPEC: NORMAL
PATH REPORT.GROSS SPEC: NORMAL

## 2024-04-05 DIAGNOSIS — I48.0 PAROXYSMAL ATRIAL FIBRILLATION: Chronic | ICD-10-CM

## 2024-04-05 DIAGNOSIS — I10 ESSENTIAL HYPERTENSION: Chronic | ICD-10-CM

## 2024-04-08 RX ORDER — SOTALOL HYDROCHLORIDE 160 MG/1
TABLET ORAL
Qty: 180 TABLET | Refills: 0 | Status: SHIPPED | OUTPATIENT
Start: 2024-04-08

## 2024-04-09 DIAGNOSIS — I10 ESSENTIAL HYPERTENSION: Chronic | ICD-10-CM

## 2024-04-09 DIAGNOSIS — I48.0 PAROXYSMAL ATRIAL FIBRILLATION: Chronic | ICD-10-CM

## 2024-04-12 ENCOUNTER — TELEPHONE (OUTPATIENT)
Dept: CARDIOLOGY | Facility: CLINIC | Age: 74
End: 2024-04-12
Payer: MEDICARE

## 2024-04-12 DIAGNOSIS — I48.0 PAROXYSMAL ATRIAL FIBRILLATION: Primary | Chronic | ICD-10-CM

## 2024-04-12 RX ORDER — RIVAROXABAN 20 MG/1
TABLET, FILM COATED ORAL
Qty: 30 TABLET | Refills: 0 | Status: SHIPPED | OUTPATIENT
Start: 2024-04-12

## 2024-04-12 NOTE — TELEPHONE ENCOUNTER
I spoke to patient who states he has not had any labs completed in the last 6-12 months. Informed patient you will place the lab orders so he can have them completed. Patient states he will have them completed at [facility]

## 2024-04-12 NOTE — TELEPHONE ENCOUNTER
Please call patient.  They are calling for refills on his rivaroxaban.  He has not had blood work completed in the [facility] system since at least 2022.  Has he had blood work completed in the last 6 to 12 months anywhere else?    If not, he needs to go to the hospital lab to have a BMP and CBC completed.  I can place the orders.  Thank you

## 2024-04-17 ENCOUNTER — LAB (OUTPATIENT)
Dept: LAB | Facility: HOSPITAL | Age: 74
End: 2024-04-17
Payer: MEDICARE

## 2024-04-17 DIAGNOSIS — I48.0 PAROXYSMAL ATRIAL FIBRILLATION: ICD-10-CM

## 2024-04-17 LAB
ANION GAP SERPL CALCULATED.3IONS-SCNC: 13.2 MMOL/L (ref 5–15)
BASOPHILS # BLD AUTO: 0.05 10*3/MM3 (ref 0–0.2)
BASOPHILS NFR BLD AUTO: 0.5 % (ref 0–1.5)
BUN SERPL-MCNC: 15 MG/DL (ref 8–23)
BUN/CREAT SERPL: 17.6 (ref 7–25)
CALCIUM SPEC-SCNC: 9.1 MG/DL (ref 8.6–10.5)
CHLORIDE SERPL-SCNC: 105 MMOL/L (ref 98–107)
CO2 SERPL-SCNC: 19.8 MMOL/L (ref 22–29)
CREAT SERPL-MCNC: 0.85 MG/DL (ref 0.76–1.27)
DEPRECATED RDW RBC AUTO: 40.8 FL (ref 37–54)
EGFRCR SERPLBLD CKD-EPI 2021: 92.3 ML/MIN/1.73
EOSINOPHIL # BLD AUTO: 0.41 10*3/MM3 (ref 0–0.4)
EOSINOPHIL NFR BLD AUTO: 3.7 % (ref 0.3–6.2)
ERYTHROCYTE [DISTWIDTH] IN BLOOD BY AUTOMATED COUNT: 12.5 % (ref 12.3–15.4)
GLUCOSE SERPL-MCNC: 94 MG/DL (ref 65–99)
HCT VFR BLD AUTO: 46.3 % (ref 37.5–51)
HGB BLD-MCNC: 15.9 G/DL (ref 13–17.7)
IMM GRANULOCYTES # BLD AUTO: 0.06 10*3/MM3 (ref 0–0.05)
IMM GRANULOCYTES NFR BLD AUTO: 0.5 % (ref 0–0.5)
LYMPHOCYTES # BLD AUTO: 3.13 10*3/MM3 (ref 0.7–3.1)
LYMPHOCYTES NFR BLD AUTO: 28.6 % (ref 19.6–45.3)
MCH RBC QN AUTO: 31.2 PG (ref 26.6–33)
MCHC RBC AUTO-ENTMCNC: 34.3 G/DL (ref 31.5–35.7)
MCV RBC AUTO: 90.8 FL (ref 79–97)
MONOCYTES # BLD AUTO: 1.11 10*3/MM3 (ref 0.1–0.9)
MONOCYTES NFR BLD AUTO: 10.1 % (ref 5–12)
NEUTROPHILS NFR BLD AUTO: 56.6 % (ref 42.7–76)
NEUTROPHILS NFR BLD AUTO: 6.18 10*3/MM3 (ref 1.7–7)
NRBC BLD AUTO-RTO: 0 /100 WBC (ref 0–0.2)
PLATELET # BLD AUTO: 214 10*3/MM3 (ref 140–450)
PMV BLD AUTO: 11.6 FL (ref 6–12)
POTASSIUM SERPL-SCNC: 4.5 MMOL/L (ref 3.5–5.2)
RBC # BLD AUTO: 5.1 10*6/MM3 (ref 4.14–5.8)
SODIUM SERPL-SCNC: 138 MMOL/L (ref 136–145)
WBC NRBC COR # BLD AUTO: 10.94 10*3/MM3 (ref 3.4–10.8)

## 2024-04-17 PROCEDURE — 85025 COMPLETE CBC W/AUTO DIFF WBC: CPT

## 2024-04-17 PROCEDURE — 80048 BASIC METABOLIC PNL TOTAL CA: CPT

## 2024-04-17 PROCEDURE — 36415 COLL VENOUS BLD VENIPUNCTURE: CPT

## 2024-04-18 DIAGNOSIS — I10 ESSENTIAL HYPERTENSION: Chronic | ICD-10-CM

## 2024-04-18 DIAGNOSIS — I48.0 PAROXYSMAL ATRIAL FIBRILLATION: Chronic | ICD-10-CM

## 2024-04-18 NOTE — PROGRESS NOTES
Please call patient with lab results. BMP normal except for CO2 level little down.  CBC normal except for white blood cell count elevated.  Looks like this has been chronic for the past 6 years.  Defer to PCP.  Continue current medications.  I refilled his rivaroxaban.  Thank you

## 2024-07-25 ENCOUNTER — OFFICE VISIT (OUTPATIENT)
Dept: CARDIOLOGY | Facility: CLINIC | Age: 74
End: 2024-07-25
Payer: MEDICARE

## 2024-07-25 VITALS
BODY MASS INDEX: 36.18 KG/M2 | HEIGHT: 73 IN | OXYGEN SATURATION: 98 % | HEART RATE: 59 BPM | WEIGHT: 273 LBS | DIASTOLIC BLOOD PRESSURE: 86 MMHG | SYSTOLIC BLOOD PRESSURE: 116 MMHG

## 2024-07-25 DIAGNOSIS — Z98.890 S/P ABLATION OF ATRIAL FIBRILLATION: ICD-10-CM

## 2024-07-25 DIAGNOSIS — I10 PRIMARY HYPERTENSION: Chronic | ICD-10-CM

## 2024-07-25 DIAGNOSIS — Z86.79 S/P ABLATION OF ATRIAL FIBRILLATION: ICD-10-CM

## 2024-07-25 DIAGNOSIS — I48.0 PAROXYSMAL ATRIAL FIBRILLATION: Primary | Chronic | ICD-10-CM

## 2024-07-25 PROCEDURE — 99214 OFFICE O/P EST MOD 30 MIN: CPT | Performed by: INTERNAL MEDICINE

## 2024-07-25 PROCEDURE — 3079F DIAST BP 80-89 MM HG: CPT | Performed by: INTERNAL MEDICINE

## 2024-07-25 PROCEDURE — 1160F RVW MEDS BY RX/DR IN RCRD: CPT | Performed by: INTERNAL MEDICINE

## 2024-07-25 PROCEDURE — 3074F SYST BP LT 130 MM HG: CPT | Performed by: INTERNAL MEDICINE

## 2024-07-25 PROCEDURE — 1159F MED LIST DOCD IN RCRD: CPT | Performed by: INTERNAL MEDICINE

## 2024-07-25 PROCEDURE — 93000 ELECTROCARDIOGRAM COMPLETE: CPT | Performed by: INTERNAL MEDICINE

## 2024-07-25 RX ORDER — SENNOSIDES 8.6 MG
650 CAPSULE ORAL EVERY 8 HOURS PRN
COMMUNITY

## 2024-07-25 NOTE — PROGRESS NOTES
Subjective:     Encounter Date: 07/25/24        Patient ID: Hakeem Onofre Jr. is a 73 y.o. male.    Chief Complaint: PAF  History of Present Illness    Patient has a history of paroxysmal atrial fibrillation. He had a cardioversion in 2017 and again in 12/2021.  He has a history of ablation of atrial flutter in 2012, and then ablation of atrial fibrillation by Dr. Busby November 2022.  He has been maintained by them since on sotalol and Xarelto.    He denies any chest pain, pressure, tightness, squeezing, or heartburn.  He has not experienced any feeling of palpitations, tachycardia or heart racing and no presyncope or syncope.  There has not been any problems with dizziness or lightheadedness.  There has not been any orthopnea or PND, and no problems with lower extremity edema.  He denies any shortness of breath at rest or with activity and has not had any wheezing.  He  has continued to perform daily activities of living without any specific problem or change in the level of activity.    Having what he believes is a gout attack in his left hand; has had this before.    He does have sleep apnea and is on CPAP, he says he uses it compulsively and that continues to have very good scores on his CPAP usage.    He had a hospitalization at [facility] in 2018.    Seen in our office in June to follow-up from that hospitalization as well.  He states he and his wife were in [facility] to meet their new grandchild.  The day they were to leave, the patient had 3 episodes of nausea, vomiting and diarrhea.  By the third episode he saw blood in his vomitus and in his stool.  His son called EMS and he was taken to the hospital.  He was admitted to the ICU with coffee-ground emesis, dark stools and found to have supratherapeutic INR on Xarelto, bradycardic (HR 49), hypotension (70/44).  He underwent an EGD that showed acute gastritis and non-bleeding 1.2 cm gastric ulcer that was clipped.  He received a  total of 4 units of packed red blood cells as well as 1 unit of FFP.  His course was complicated by atrial flutter with RVR and he was started back on metoprolol and sotalol.  He was then restarted on a heparin drip for anticoagulation after GI clearance but patient unfortunately had a bloody bowel movement the same day after which time the heparin was stopped.  He had not had a previous GI bleeding history and states he was started on Xarelto about 5 years ago without any bleeding complications.  He had had a colonoscopy perhaps 10 years ago which showed polyps that were resected but never had an EGD before this admission.  H. pylori serology was positive, stool Ag negative.  He was instructed to have a repeat EGD in 6 to 8 weeks to assess the healing of his gastric ulcer.  He was started on Protonix 40 mg twice daily.  His hemoglobin day of discharge was 7.3.        The following portions of the patient's history were reviewed and updated as appropriate: allergies, current medications, past family history, past medical history, past social history, past surgical history and problem list.    Past Medical History:   Diagnosis Date    Abnormal ECG 2012    A-Fib    Arrhythmia     Atrial fibrillation     Colon polyp     Gastric ulcer     Gastric ulcer     GERD (gastroesophageal reflux disease)     History of 2019 novel coronavirus disease (COVID-19) 10/13/2022    History of transfusion 2019    gastric ulcer    Hypertension     Sleep apnea     uses cpap       Past Surgical History:   Procedure Laterality Date    ABLATION OF DYSRHYTHMIC FOCUS  2012    for treatment of Afib/flutter    ABLATION OF DYSRHYTHMIC FOCUS N/A     2023    CARDIAC CATHETERIZATION      CARDIAC ELECTROPHYSIOLOGY PROCEDURE N/A 11/21/2022    Procedure: Ablation atrial fibrillation;  Surgeon: Robert Busby MD;  Location: [facility] INVASIVE LOCATION;  Service: Cardiovascular;  Laterality: N/A;    CATARACT EXTRACTION      COLONOSCOPY N/A  "08/13/2019    Procedure: COLONOSCOPY TO CECUM AND TI WITH COLD SNARE POLYPECTOMIES;  Surgeon: Duc Medley MD;  Location:  [facility];  Service: Gastroenterology    COLONOSCOPY W/ POLYPECTOMY N/A 2/14/2024    Procedure: COLONOSCOPY WITH POLYPECTOMY;  Surgeon: Duc Medley MD;  Location: [facility];  Service: Gastroenterology;  Laterality: N/A;  diverticulosis, ascending polyp    ENDOSCOPY      ENDOSCOPY N/A 08/13/2019    Procedure: ESOPHAGOGASTRODUODENOSCOPY WITH COLD BIOPSIES;  Surgeon: Duc Medley MD;  Location[facility];  Service: Gastroenterology           ECG 12 Lead    Date/Time: 7/25/2024 4:43 PM  Performed by: Donnie Berger III, MD    Authorized by: Donnie Berger III, MD  Comparison: compared with previous ECG   Similar to previous ECG  Rhythm: sinus rhythm  Rate: normal  Conduction: conduction normal  ST Segments: ST segments normal  T Waves: T waves normal  QRS axis: normal  Other: no other findings    Clinical impression: normal ECG             Objective:     Vitals:    07/25/24 1304   BP: 116/86   BP Location: Right arm   Patient Position: Sitting   Cuff Size: Large Adult   Pulse: 59   SpO2: 98%   Weight: 124 kg (273 lb)   Height: 185.4 cm (73\")   Body mass index is 36.02 kg/m².          General Appearance:    Alert, cooperative, in no acute distress   Head:    Normocephalic, without obvious abnormality, atraumatic   Eyes:            Lids and lashes normal, conjunctivae and sclerae normal, no   icterus, no pallor, corneas clear, PERRLA   Ears:    Ears appear intact with no abnormalities noted   Throat:   No oral lesions, no thrush, oral mucosa moist   Neck:   No adenopathy, supple, trachea midline, no thyromegaly, no   carotid bruit, no JVD   Back:     No kyphosis present, no scoliosis present, no skin lesions, erythema or scars, no tenderness to percussion or palpation, range of motion normal   Lungs:     Clear to auscultation,respirations " regular, even and unlabored    Heart:    irregular rhythm and normal rate, normal S1 and S2, no murmur, no gallop, no rub, no click   Chest Wall:    No abnormalities observed   Abdomen:     Normal bowel sounds, no masses, no organomegaly, soft        non-tender, non-distended, no guarding, no rebound  tenderness   Extremities:   Moves all extremities well, left hand is markedly swollen, no erythema, ring is extremely tight but there is no numbness in that distal finger, normal capillary refill no edema, no cyanosis, no redness   Pulses:   Pulses palpable and equal bilaterally. Normal radial, carotid, femoral, dorsalis pedis and posterior tibial pulses bilaterally. Normal abdominal aorta   Skin:  Psychiatric:   No bleeding, bruising or rash    Alert and oriented x 3, normal mood and affect   Lab Review:           Lab Results   Component Value Date    GLUCOSE 94 04/17/2024    BUN 15 04/17/2024    CREATININE 0.85 04/17/2024    EGFRIFNONA 85 12/16/2021    BCR 17.6 04/17/2024    K 4.5 04/17/2024    CO2 19.8 (L) 04/17/2024    CALCIUM 9.1 04/17/2024    ALBUMIN 4.10 06/14/2017    AST 26 06/14/2017    ALT 21 06/14/2017     Results for orders placed during the hospital encounter of 10/26/21    Adult Transthoracic Echo Complete W/ Cont if Necessary Per Protocol    Interpretation Summary  · There is calcification of the aortic valve.  · Estimated left ventricular EF = 60% Left ventricular systolic function is normal.  · Left ventricular diastolic function was indeterminate.          Assessment:          Diagnosis Plan   1. Paroxysmal atrial fibrillation  ECG 12 Lead      2. Primary hypertension  ECG 12 Lead      3. S/P ablation of atrial fibrillation  ECG 12 Lead               Plan:       1.  Paroxysmal atrial fibrillation-status post ablation of atrial fibrillation by Dr. Busby November 2022, they have continued Xarelto and sotalol, remains in sinus rhythm.  2.  History of atrial flutter status post a flutter ablation  3.   Chronic anticoagulation, history of GI bleed but currently doing well   4.  Obstructive sleep apnea on CPAP, highly compliant with CPAP  5.  Morbid obesity, complicating all aspects of care  6. Swollen left hand-patient does have marked swelling to his left hand which he believes is due to gout and he says that he has been diagnosed with this before, says it is improving.  Still markedly swollen but no erythema, no drainage, wedding ring on his left fourth finger is extremely tight but he has normal capillary refill and he says no numbness or tingling in that finger.  I did instruct him to go promptly to the emergency room if any issues and/or if it does not promptly resolve.    Thank you very much for allowing us to participate in the care of this pleasant patient.  Please don't hesitate to call if I can be of assistance in any way.      Current Outpatient Medications:     acetaminophen (Tylenol 8 Hour) 650 MG 8 hr tablet, Take 1 tablet by mouth Every 8 (Eight) Hours As Needed for Mild Pain., Disp: , Rfl:     pantoprazole (PROTONIX) 40 MG EC tablet, TAKE 1 TABLET BY MOUTH ONCE DAILY . APPOINTMENT REQUIRED FOR FUTURE REFILLS, Disp: 90 tablet, Rfl: 0    rivaroxaban (Xarelto) 20 MG tablet, Take 1 tablet by mouth Daily., Disp: 90 tablet, Rfl: 0    sotalol (BETAPACE) 160 MG tablet, Take 1 tablet by mouth twice daily, Disp: 180 tablet, Rfl: 0     Return in about 1 year (around 7/25/2025).

## 2024-07-31 DIAGNOSIS — I48.0 PAROXYSMAL ATRIAL FIBRILLATION: Chronic | ICD-10-CM

## 2024-07-31 DIAGNOSIS — I10 ESSENTIAL HYPERTENSION: Chronic | ICD-10-CM

## 2024-07-31 RX ORDER — RIVAROXABAN 20 MG/1
20 TABLET, FILM COATED ORAL DAILY
Qty: 90 TABLET | Refills: 0 | Status: SHIPPED | OUTPATIENT
Start: 2024-07-31

## 2024-10-10 DIAGNOSIS — I48.0 PAROXYSMAL ATRIAL FIBRILLATION: Chronic | ICD-10-CM

## 2024-10-10 DIAGNOSIS — I10 ESSENTIAL HYPERTENSION: Chronic | ICD-10-CM

## 2024-10-10 RX ORDER — SOTALOL HYDROCHLORIDE 160 MG/1
TABLET ORAL
Qty: 180 TABLET | Refills: 0 | Status: SHIPPED | OUTPATIENT
Start: 2024-10-10

## 2024-10-25 DIAGNOSIS — I48.0 PAROXYSMAL ATRIAL FIBRILLATION: Chronic | ICD-10-CM

## 2024-10-25 DIAGNOSIS — I10 ESSENTIAL HYPERTENSION: Chronic | ICD-10-CM

## 2024-10-25 RX ORDER — RIVAROXABAN 20 MG/1
20 TABLET, FILM COATED ORAL DAILY
Qty: 90 TABLET | Refills: 0 | Status: SHIPPED | OUTPATIENT
Start: 2024-10-25

## 2025-01-06 DIAGNOSIS — I48.0 PAROXYSMAL ATRIAL FIBRILLATION: Chronic | ICD-10-CM

## 2025-01-06 DIAGNOSIS — I10 ESSENTIAL HYPERTENSION: Chronic | ICD-10-CM

## 2025-01-07 RX ORDER — SOTALOL HYDROCHLORIDE 160 MG/1
TABLET ORAL
Qty: 180 TABLET | Refills: 1 | Status: SHIPPED | OUTPATIENT
Start: 2025-01-07

## 2025-01-20 DIAGNOSIS — I48.0 PAROXYSMAL ATRIAL FIBRILLATION: Chronic | ICD-10-CM

## 2025-01-20 DIAGNOSIS — I10 ESSENTIAL HYPERTENSION: Chronic | ICD-10-CM

## 2025-01-20 RX ORDER — RIVAROXABAN 20 MG/1
20 TABLET, FILM COATED ORAL DAILY
Qty: 90 TABLET | Refills: 1 | Status: SHIPPED | OUTPATIENT
Start: 2025-01-20

## 2025-06-30 DIAGNOSIS — I48.0 PAROXYSMAL ATRIAL FIBRILLATION: Chronic | ICD-10-CM

## 2025-06-30 DIAGNOSIS — I10 ESSENTIAL HYPERTENSION: Chronic | ICD-10-CM

## 2025-06-30 RX ORDER — SOTALOL HYDROCHLORIDE 160 MG/1
160 TABLET ORAL 2 TIMES DAILY
Qty: 180 TABLET | Refills: 0 | Status: SHIPPED | OUTPATIENT
Start: 2025-06-30

## 2025-07-18 DIAGNOSIS — I48.0 PAROXYSMAL ATRIAL FIBRILLATION: Chronic | ICD-10-CM

## 2025-07-18 DIAGNOSIS — I10 ESSENTIAL HYPERTENSION: Chronic | ICD-10-CM

## 2025-07-18 RX ORDER — RIVAROXABAN 20 MG/1
20 TABLET, FILM COATED ORAL DAILY
Qty: 90 TABLET | Refills: 0 | Status: SHIPPED | OUTPATIENT
Start: 2025-07-18

## 2025-08-12 ENCOUNTER — TELEPHONE (OUTPATIENT)
Dept: PEDIATRICS | Facility: OTHER | Age: 75
End: 2025-08-12
Payer: MEDICARE

## 2025-08-13 ENCOUNTER — OFFICE VISIT (OUTPATIENT)
Dept: CARDIOLOGY | Facility: CLINIC | Age: 75
End: 2025-08-13
Payer: MEDICARE

## 2025-08-13 VITALS
OXYGEN SATURATION: 97 % | WEIGHT: 267.7 LBS | HEIGHT: 73 IN | SYSTOLIC BLOOD PRESSURE: 130 MMHG | BODY MASS INDEX: 35.48 KG/M2 | HEART RATE: 62 BPM | DIASTOLIC BLOOD PRESSURE: 72 MMHG

## 2025-08-13 DIAGNOSIS — I48.0 PAROXYSMAL ATRIAL FIBRILLATION: Chronic | ICD-10-CM

## 2025-08-13 DIAGNOSIS — G47.33 OSA ON CPAP: ICD-10-CM

## 2025-08-13 DIAGNOSIS — Z86.79 S/P ABLATION OF ATRIAL FIBRILLATION: Primary | ICD-10-CM

## 2025-08-13 DIAGNOSIS — Z98.890 S/P ABLATION OF ATRIAL FIBRILLATION: Primary | ICD-10-CM

## 2025-08-13 DIAGNOSIS — I10 PRIMARY HYPERTENSION: Chronic | ICD-10-CM

## (undated) DEVICE — PINNACLE INTRODUCER SHEATH: Brand: PINNACLE

## (undated) DEVICE — Device: Brand: WEBSTER CS

## (undated) DEVICE — JACKT LAB F/R KNIT CUFF/COLR XLG BLU

## (undated) DEVICE — THE TORRENT IRRIGATION SCOPE CONNECTOR IS USED WITH THE TORRENT IRRIGATION TUBING TO PROVIDE IRRIGATION FLUIDS SUCH AS STERILE WATER DURING GASTROINTESTINAL ENDOSCOPIC PROCEDURES WHEN USED IN CONJUNCTION WITH AN IRRIGATION PUMP (OR ELECTROSURGICAL UNIT).: Brand: TORRENT

## (undated) DEVICE — THE SINGLE USE ETRAP – POLYP TRAP IS USED FOR SUCTION RETRIEVAL OF ENDOSCOPICALLY REMOVED POLYPS.: Brand: ETRAP

## (undated) DEVICE — CANNULA,OXY,ADULT,SOFT-TOUCH,25 TUBE,UC: Brand: MEDLINE

## (undated) DEVICE — FRCP BX RADJAW4 NDL 2.8 240CM LG OG BX40

## (undated) DEVICE — LINER SURG CANSTR SXN S/RIGD 1500CC

## (undated) DEVICE — SENSR O2 OXIMAX FNGR A/ 18IN NONSTR

## (undated) DEVICE — PROB S-CATH TEMP ESOPH 10F

## (undated) DEVICE — SINGLE-USE BIOPSY FORCEPS: Brand: RADIAL JAW 4

## (undated) DEVICE — OCTA,PERSEID,2-2-2-2-2,F-CURVE: Brand: OCTARAY MAPPING CATHETER

## (undated) DEVICE — SNAR POLYP CAPTIVATOR/COLD STFF RND 10MM 240CM

## (undated) DEVICE — VIAL FORMALIN CAP 10P 40ML

## (undated) DEVICE — Device

## (undated) DEVICE — LOU EP: Brand: MEDLINE INDUSTRIES, INC.

## (undated) DEVICE — ADAPT CLN BIOGUARD AIR/H2O DISP

## (undated) DEVICE — TRAP POLYP ETRAP 2PK

## (undated) DEVICE — GLV SURG SENSICARE PI MIC PF SZ8 LF STRL

## (undated) DEVICE — Device: Brand: THERMOCOOL SMARTTOUCH SF

## (undated) DEVICE — Device: Brand: REFERENCE PATCH CARTO 3

## (undated) DEVICE — SOL IRR H2O BTL 1000ML STRL

## (undated) DEVICE — CLEARSIGHT FINGER CUFF LARGE MULTI PACK: Brand: CLEARSIGHT

## (undated) DEVICE — BRSH CLN ENDO CH 2200MM

## (undated) DEVICE — TUBING, SUCTION, 1/4" X 10', STRAIGHT: Brand: MEDLINE

## (undated) DEVICE — 1 X VERSACROSS TRANSSEPTAL SHEATH (INCLUDING  1 X J-TIP GUIDEWIRE); 1 X VERSACROSS RF WIRE (INCLUDING 1 X CONNECTOR CABLE (SINGLE USE)); 1 X DISPERSIVE ELECTRODE: Brand: VERSACROSS ACCESS SOLUTION

## (undated) DEVICE — SYR LL W/SCALE/MARK 3ML STRL

## (undated) DEVICE — KT ORCA ORCAPOD DISP STRL

## (undated) DEVICE — Device: Brand: SOUNDSTAR

## (undated) DEVICE — SNAR POLYP SENSATION STDOVL 27 240 BX40

## (undated) DEVICE — BITEBLOCK OMNI BLOC

## (undated) DEVICE — Device: Brand: VIZIGO

## (undated) DEVICE — Device: Brand: DEFENDO AIR/WATER/SUCTION AND BIOPSY VALVE

## (undated) DEVICE — Device: Brand: SMARTABLATE